# Patient Record
Sex: FEMALE | Race: WHITE | HISPANIC OR LATINO | URBAN - METROPOLITAN AREA
[De-identification: names, ages, dates, MRNs, and addresses within clinical notes are randomized per-mention and may not be internally consistent; named-entity substitution may affect disease eponyms.]

---

## 2018-04-26 ENCOUNTER — ON CAMPUS - OUTPATIENT (OUTPATIENT)
Dept: URBAN - METROPOLITAN AREA HOSPITAL 108 | Facility: HOSPITAL | Age: 38
End: 2018-04-26
Payer: COMMERCIAL

## 2018-04-26 DIAGNOSIS — K75.9 INFLAMMATORY LIVER DISEASE, UNSPECIFIED: ICD-10-CM

## 2018-04-26 PROCEDURE — 99243 OFF/OP CNSLTJ NEW/EST LOW 30: CPT

## 2018-04-27 PROCEDURE — 99212 OFFICE O/P EST SF 10 MIN: CPT

## 2018-05-01 ENCOUNTER — INPATIENT HOSPITAL (OUTPATIENT)
Dept: URBAN - METROPOLITAN AREA HOSPITAL 107 | Facility: HOSPITAL | Age: 38
End: 2018-05-01
Payer: COMMERCIAL

## 2018-05-01 DIAGNOSIS — R94.5 ABNORMAL RESULTS OF LIVER FUNCTION STUDIES: ICD-10-CM

## 2018-05-01 DIAGNOSIS — B17.9 ACUTE VIRAL HEPATITIS, UNSPECIFIED: ICD-10-CM

## 2018-05-01 DIAGNOSIS — R17 UNSPECIFIED JAUNDICE: ICD-10-CM

## 2018-05-01 DIAGNOSIS — L29.9 PRURITUS, UNSPECIFIED: ICD-10-CM

## 2018-05-01 PROCEDURE — 99233 SBSQ HOSP IP/OBS HIGH 50: CPT | Performed by: INTERNAL MEDICINE

## 2018-05-02 ENCOUNTER — INPATIENT HOSPITAL (OUTPATIENT)
Dept: URBAN - METROPOLITAN AREA HOSPITAL 107 | Facility: HOSPITAL | Age: 38
End: 2018-05-02
Payer: COMMERCIAL

## 2018-05-02 DIAGNOSIS — R94.5 ABNORMAL RESULTS OF LIVER FUNCTION STUDIES: ICD-10-CM

## 2018-05-02 DIAGNOSIS — K75.9 INFLAMMATORY LIVER DISEASE, UNSPECIFIED: ICD-10-CM

## 2018-05-02 PROCEDURE — 99232 SBSQ HOSP IP/OBS MODERATE 35: CPT | Performed by: PHYSICIAN ASSISTANT

## 2019-10-31 ENCOUNTER — INITIAL PRENATAL (OUTPATIENT)
Dept: OBSTETRICS AND GYNECOLOGY | Facility: CLINIC | Age: 39
End: 2019-10-31

## 2019-10-31 ENCOUNTER — PROCEDURE VISIT (OUTPATIENT)
Dept: OBSTETRICS AND GYNECOLOGY | Facility: CLINIC | Age: 39
End: 2019-10-31

## 2019-10-31 VITALS — SYSTOLIC BLOOD PRESSURE: 136 MMHG | DIASTOLIC BLOOD PRESSURE: 80 MMHG | WEIGHT: 170 LBS

## 2019-10-31 DIAGNOSIS — O09.521 MULTIGRAVIDA OF ADVANCED MATERNAL AGE IN FIRST TRIMESTER: ICD-10-CM

## 2019-10-31 DIAGNOSIS — O30.041 DICHORIONIC DIAMNIOTIC TWIN PREGNANCY IN FIRST TRIMESTER: ICD-10-CM

## 2019-10-31 DIAGNOSIS — E03.9 HYPOTHYROIDISM AFFECTING PREGNANCY IN FIRST TRIMESTER: ICD-10-CM

## 2019-10-31 DIAGNOSIS — I10 CHRONIC HYPERTENSION: ICD-10-CM

## 2019-10-31 DIAGNOSIS — O99.281 HYPOTHYROIDISM AFFECTING PREGNANCY IN FIRST TRIMESTER: ICD-10-CM

## 2019-10-31 DIAGNOSIS — Z34.01 ENCOUNTER FOR SUPERVISION OF NORMAL FIRST PREGNANCY IN FIRST TRIMESTER: Primary | ICD-10-CM

## 2019-10-31 DIAGNOSIS — O30.041 DICHORIONIC DIAMNIOTIC TWIN PREGNANCY IN FIRST TRIMESTER: Primary | ICD-10-CM

## 2019-10-31 PROCEDURE — 76817 TRANSVAGINAL US OBSTETRIC: CPT | Performed by: OBSTETRICS & GYNECOLOGY

## 2019-10-31 PROCEDURE — 0501F PRENATAL FLOW SHEET: CPT | Performed by: OBSTETRICS & GYNECOLOGY

## 2019-10-31 RX ORDER — FOLIC ACID 1 MG/1
1000 TABLET ORAL DAILY
Refills: 5 | COMMUNITY
Start: 2019-10-07

## 2019-10-31 RX ORDER — LEVOTHYROXINE SODIUM 0.1 MG/1
100 TABLET ORAL DAILY
Refills: 0 | COMMUNITY
Start: 2019-10-18 | End: 2020-05-09 | Stop reason: HOSPADM

## 2019-10-31 RX ORDER — NIFEDIPINE 60 MG/1
60 TABLET, FILM COATED, EXTENDED RELEASE ORAL DAILY
Refills: 0 | COMMUNITY
Start: 2019-10-18

## 2019-10-31 NOTE — PROGRESS NOTES
CC initial prenatal visit for this 39-year-old  Ab1  female using IVF implantation EDC 2020.  Patient has  di-twin gestation with no current problems and fetal heart tones x2 today.  She did have donor oocytes but does want fetal DNA testing in 2 weeks although amniocentesis is not desired.  She has hypothyroidism and is on levothyroxine 100 mcg a day and chronic hypertension treated with Procardia 60 mg a day.  She is interested in a flu shot and Tdap.  She will refill received a flu shot in 2 weeks when she has her Materna 21 test.  She has had a previous left S&O.  Review of Systems - ENT ROS: negative  Hematological and Lymphatic ROS: negative  Endocrine ROS: negative  Breast ROS: negative  Respiratory ROS: negative  Cardiovascular ROS: negative  Gastrointestinal ROS: negative  Genito-Urinary ROS: negative  Musculoskeletal ROS: negative  Neurological ROS: negative  Dermatological ROS: negative  Assessment.  10 weeks gestation with di-di-twins, advanced maternal age with donor oocytes, history hypothyroidism and tonic hypertension, one previous miscarriage during IVF.  Plan.  Return office 2 weeks for Materna 21 and flu shot.  Thyroid function tests today.  Prenatal 3, urine culture, hemoglobin A1c, CMP,.  Patient refusing CF testing at this time.

## 2019-11-01 LAB
ABO GROUP BLD: (no result)
ALBUMIN SERPL-MCNC: 4.1 G/DL (ref 3.5–5.5)
ALBUMIN/GLOB SERPL: 1.4 {RATIO} (ref 1.2–2.2)
ALP SERPL-CCNC: 63 IU/L (ref 39–117)
ALT SERPL-CCNC: 15 IU/L (ref 0–32)
AST SERPL-CCNC: 20 IU/L (ref 0–40)
BACTERIA UR CULT: NO GROWTH
BACTERIA UR CULT: NORMAL
BASOPHILS # BLD AUTO: 0 X10E3/UL (ref 0–0.2)
BASOPHILS NFR BLD AUTO: 0 %
BILIRUB SERPL-MCNC: 0.4 MG/DL (ref 0–1.2)
BLD GP AB SCN SERPL QL: NEGATIVE
BUN SERPL-MCNC: 10 MG/DL (ref 6–24)
BUN/CREAT SERPL: 13 (ref 9–23)
CALCIUM SERPL-MCNC: 10.4 MG/DL (ref 8.7–10.2)
CHLORIDE SERPL-SCNC: 103 MMOL/L (ref 96–106)
CO2 SERPL-SCNC: 18 MMOL/L (ref 20–29)
CREAT SERPL-MCNC: 0.75 MG/DL (ref 0.57–1)
EOSINOPHIL # BLD AUTO: 0.1 X10E3/UL (ref 0–0.4)
EOSINOPHIL NFR BLD AUTO: 2 %
ERYTHROCYTE [DISTWIDTH] IN BLOOD BY AUTOMATED COUNT: 14.5 % (ref 12.3–15.4)
EST. AVERAGE GLUCOSE BLD GHB EST-MCNC: 100 MG/DL
FT4I SERPL CALC-MCNC: 1.8 (ref 1.2–4.9)
GLOBULIN SER CALC-MCNC: 3 G/DL (ref 1.5–4.5)
GLUCOSE SERPL-MCNC: 106 MG/DL (ref 65–99)
HBA1C MFR BLD: 5.1 % (ref 4.8–5.6)
HBV SURFACE AG SERPL QL IA: NEGATIVE
HCT VFR BLD AUTO: 38.2 % (ref 34–46.6)
HCV AB S/CO SERPL IA: <0.1 S/CO RATIO (ref 0–0.9)
HGB BLD-MCNC: 13.4 G/DL (ref 11.1–15.9)
HIV 1+2 AB+HIV1 P24 AG SERPL QL IA: NON REACTIVE
IMM GRANULOCYTES # BLD AUTO: 0 X10E3/UL (ref 0–0.1)
IMM GRANULOCYTES NFR BLD AUTO: 0 %
LYMPHOCYTES # BLD AUTO: 1.2 X10E3/UL (ref 0.7–3.1)
LYMPHOCYTES NFR BLD AUTO: 14 %
MCH RBC QN AUTO: 31 PG (ref 26.6–33)
MCHC RBC AUTO-ENTMCNC: 35.1 G/DL (ref 31.5–35.7)
MCV RBC AUTO: 88 FL (ref 79–97)
MONOCYTES # BLD AUTO: 0.7 X10E3/UL (ref 0.1–0.9)
MONOCYTES NFR BLD AUTO: 8 %
NEUTROPHILS # BLD AUTO: 6.2 X10E3/UL (ref 1.4–7)
NEUTROPHILS NFR BLD AUTO: 76 %
PLATELET # BLD AUTO: 284 X10E3/UL (ref 150–450)
POTASSIUM SERPL-SCNC: 4.2 MMOL/L (ref 3.5–5.2)
PROT SERPL-MCNC: 7.1 G/DL (ref 6–8.5)
RBC # BLD AUTO: 4.32 X10E6/UL (ref 3.77–5.28)
RH BLD: NEGATIVE
RPR SER QL: NON REACTIVE
RUBV IGG SERPL IA-ACNC: 20.3 INDEX
SODIUM SERPL-SCNC: 137 MMOL/L (ref 134–144)
T3RU NFR SERPL: 14 % (ref 24–39)
T4 SERPL-MCNC: 12.8 UG/DL (ref 4.5–12)
TSH SERPL DL<=0.005 MIU/L-ACNC: 1.54 UIU/ML (ref 0.45–4.5)
WBC # BLD AUTO: 8.2 X10E3/UL (ref 3.4–10.8)

## 2019-11-04 LAB
C TRACH RRNA CVX QL NAA+PROBE: NEGATIVE
CONV .: NORMAL
CYTOLOGIST CVX/VAG CYTO: NORMAL
CYTOLOGY CVX/VAG DOC CYTO: NORMAL
CYTOLOGY CVX/VAG DOC THIN PREP: NORMAL
DX ICD CODE: NORMAL
HIV 1 & 2 AB SER-IMP: NORMAL
N GONORRHOEA RRNA CVX QL NAA+PROBE: NEGATIVE
OTHER STN SPEC: NORMAL
STAT OF ADQ CVX/VAG CYTO-IMP: NORMAL

## 2019-11-18 ENCOUNTER — PROCEDURE VISIT (OUTPATIENT)
Dept: OBSTETRICS AND GYNECOLOGY | Facility: CLINIC | Age: 39
End: 2019-11-18

## 2019-11-18 ENCOUNTER — ROUTINE PRENATAL (OUTPATIENT)
Dept: OBSTETRICS AND GYNECOLOGY | Facility: CLINIC | Age: 39
End: 2019-11-18

## 2019-11-18 VITALS — DIASTOLIC BLOOD PRESSURE: 80 MMHG | SYSTOLIC BLOOD PRESSURE: 128 MMHG | WEIGHT: 175 LBS

## 2019-11-18 DIAGNOSIS — Z34.02 ENCOUNTER FOR SUPERVISION OF NORMAL FIRST PREGNANCY IN SECOND TRIMESTER: Primary | ICD-10-CM

## 2019-11-18 DIAGNOSIS — O30.049 TWIN PREGNANCY, DICHORIONIC/DIAMNIOTIC, UNSPECIFIED TRIMESTER: ICD-10-CM

## 2019-11-18 DIAGNOSIS — O09.522 MULTIGRAVIDA OF ADVANCED MATERNAL AGE IN SECOND TRIMESTER: ICD-10-CM

## 2019-11-18 DIAGNOSIS — O30.049 TWIN PREGNANCY, DICHORIONIC/DIAMNIOTIC, UNSPECIFIED TRIMESTER: Primary | ICD-10-CM

## 2019-11-18 PROCEDURE — 90674 CCIIV4 VAC NO PRSV 0.5 ML IM: CPT | Performed by: OBSTETRICS & GYNECOLOGY

## 2019-11-18 PROCEDURE — 0502F SUBSEQUENT PRENATAL CARE: CPT | Performed by: OBSTETRICS & GYNECOLOGY

## 2019-11-18 PROCEDURE — 90471 IMMUNIZATION ADMIN: CPT | Performed by: OBSTETRICS & GYNECOLOGY

## 2019-11-18 PROCEDURE — 76802 OB US < 14 WKS ADDL FETUS: CPT | Performed by: OBSTETRICS & GYNECOLOGY

## 2019-11-18 PROCEDURE — 76801 OB US < 14 WKS SINGLE FETUS: CPT | Performed by: OBSTETRICS & GYNECOLOGY

## 2019-11-18 NOTE — PROGRESS NOTES
CC follow-up prenatal visit.  Getting Materna 21 fetal DNA testing today.  Patient with di-di-twin gestation.  Flu shot today.  Note for work given as should not work greater than 40 hours a week.  Return to office 4 weeks.

## 2019-11-25 LAB
CFDNA.FET/CFDNA.TOTAL SFR FETUS: NORMAL %
CITATION REF LAB TEST: NORMAL
FET CHR 13 TS PLAS.CFDNA QL: NEGATIVE
FET CHR 13+18+21 TS PLAS.CFDNA QL: NORMAL
FET CHR 18 TS PLAS.CFDNA QL: NEGATIVE
FET CHR 21 TS PLAS.CFDNA QL: NEGATIVE
FET Y CHROM PLAS.CFDNA QL: DETECTED
FET Y CHROM PLAS.CFDNA: NORMAL
FETAL FRACTION: NORMAL
GESTATION: NORMAL
GESTATIONAL AGE > 9:: NORMAL
LAB DIRECTOR NAME PROVIDER: NORMAL
LABORATORY COMMENT REPORT: NORMAL
LIMITATIONS OF THE TEST: NORMAL
NOTE: NORMAL
POSITIVE PREDICTIVE VALUE: NORMAL
REF LAB TEST METHOD: NORMAL
SERVICE CMNT 02-IMP: NORMAL
SERVICE CMNT 03-IMP: NORMAL
SERVICE CMNT-IMP: NORMAL
TEST PERFORMANCE INFO SPEC: NORMAL
TEST PERFORMANCE INFO SPEC: NORMAL

## 2019-11-27 ENCOUNTER — TELEPHONE (OUTPATIENT)
Dept: OBSTETRICS AND GYNECOLOGY | Facility: CLINIC | Age: 39
End: 2019-11-27

## 2019-11-27 NOTE — TELEPHONE ENCOUNTER
Patients relative is on the HIPPA form she is calling to get the results of patients genetic testing and gender of baby for their gender reveal party patient would like call back at [phone]

## 2019-11-27 NOTE — TELEPHONE ENCOUNTER
Explained to patient's relative who is on the HIPPA form that although this test was normal for Down's and did show evidence of a male baby, I am not confident that this is accurate enough in checking for either Down's or sex due to the twin gestation.  Reportedly Panorama may be better with twins or even amniocentesis if she really needs to know about Down's.  If we are just interested in checking sex we can do that at 18 weeks with an ultrasound.  I have instructed the relative to have the patient call me next week so we can further discuss whether we need to schedule other testing.  REINIER

## 2019-12-18 ENCOUNTER — ROUTINE PRENATAL (OUTPATIENT)
Dept: OBSTETRICS AND GYNECOLOGY | Facility: CLINIC | Age: 39
End: 2019-12-18

## 2019-12-18 ENCOUNTER — PROCEDURE VISIT (OUTPATIENT)
Dept: OBSTETRICS AND GYNECOLOGY | Facility: CLINIC | Age: 39
End: 2019-12-18

## 2019-12-18 VITALS — DIASTOLIC BLOOD PRESSURE: 88 MMHG | WEIGHT: 175 LBS | SYSTOLIC BLOOD PRESSURE: 130 MMHG

## 2019-12-18 DIAGNOSIS — O09.522 MULTIGRAVIDA OF ADVANCED MATERNAL AGE IN SECOND TRIMESTER: ICD-10-CM

## 2019-12-18 DIAGNOSIS — Z34.02 ENCOUNTER FOR SUPERVISION OF NORMAL FIRST PREGNANCY IN SECOND TRIMESTER: Primary | ICD-10-CM

## 2019-12-18 DIAGNOSIS — O09.521 MULTIGRAVIDA OF ADVANCED MATERNAL AGE IN FIRST TRIMESTER: ICD-10-CM

## 2019-12-18 DIAGNOSIS — O30.049 TWIN PREGNANCY, DICHORIONIC/DIAMNIOTIC, UNSPECIFIED TRIMESTER: ICD-10-CM

## 2019-12-18 DIAGNOSIS — O30.042 DICHORIONIC DIAMNIOTIC TWIN PREGNANCY IN SECOND TRIMESTER: Primary | ICD-10-CM

## 2019-12-18 PROCEDURE — 76817 TRANSVAGINAL US OBSTETRIC: CPT | Performed by: OBSTETRICS & GYNECOLOGY

## 2019-12-18 PROCEDURE — 0502F SUBSEQUENT PRENATAL CARE: CPT | Performed by: OBSTETRICS & GYNECOLOGY

## 2019-12-18 NOTE — PROGRESS NOTES
CC follow-up prenatal visit.  Ultrasound today fetal heart tones 153 and 149 transverse/breech cervical length 3.8 cm .  Offered patient additional fetal DNA testing with panorama or amniocentesis due to advanced maternal age but patient refuses these as VIP not desired regardless.  Nevertheless will make appointment with M consultation.  Checking AFP and TFTs today.  Return to office 4 weeks.

## 2019-12-19 ENCOUNTER — TELEPHONE (OUTPATIENT)
Dept: OBSTETRICS AND GYNECOLOGY | Facility: CLINIC | Age: 39
End: 2019-12-19

## 2019-12-19 LAB
FT4I SERPL CALC-MCNC: 1.5 (ref 1.2–4.9)
T3RU NFR SERPL: 13 % (ref 24–39)
T4 SERPL-MCNC: 11.9 UG/DL (ref 4.5–12)
TSH SERPL DL<=0.005 MIU/L-ACNC: 0.7 UIU/ML (ref 0.45–4.5)

## 2019-12-19 NOTE — TELEPHONE ENCOUNTER
----- Message from Chad Baum MD sent at 12/19/2019 12:25 PM EST -----  Please tell patient that all her thyroid tests were normal for pregnancy.

## 2019-12-20 LAB
AFP ADJ MOM SERPL: 2.49
AFP INTERP SERPL-IMP: NORMAL
AFP INTERP SERPL-IMP: NORMAL
AFP SERPL-MCNC: 84.3 NG/ML
AGE AT DELIVERY: 42.2 YR
GA METHOD: NORMAL
GA: 16.9 WEEKS
IDDM PATIENT QL: NO
LABORATORY COMMENT REPORT: NORMAL
MULTIPLE PREGNANCY: NORMAL
NEURAL TUBE DEFECT RISK FETUS: 726 %
RESULT: NORMAL

## 2019-12-30 ENCOUNTER — TELEPHONE (OUTPATIENT)
Dept: OBSTETRICS AND GYNECOLOGY | Facility: CLINIC | Age: 39
End: 2019-12-30

## 2019-12-30 NOTE — TELEPHONE ENCOUNTER
----- Message from Chad Baum MD sent at 12/30/2019 12:38 PM EST -----  Regarding: FW: Non-Urgent Medical Question  Contact: [phone]  Julieta this patient is supposed to be  getting an appointment with MFM at [facility].  Could you check into this.  She sent a note today to me asking about when her appointment is being scheduled.  Thanks.  REINIER  ----- Message -----  From: Tyron Reynolds CMA  Sent: 12/30/2019  11:36 AM EST  To: Chad Baum MD  Subject: FW: Non-Urgent Medical Question                   See email from patient   ----- Message -----  From: Stephanie Rodriguez  Sent: 12/30/2019  11:25 AM EST  To: [facility]  Subject: Non-Urgent Medical Question                      Good morning, I have some cold, my whole life I've had a terrible cough, last night I cough a lot, I haven't had fever or any other symptoms, I think is because my allergies and the weather conditions, is there any medication I can use? Can I use loratadine for allergies? Please let me know, when I'm coughing for several days I could have other complications.   I need to know too when is my next appointment for ultrasound, is an special one in [facility], is supposed they call me to tell me what day is going to be programmed. Thanks for everything, and you all have a Happy new year!

## 2019-12-30 NOTE — TELEPHONE ENCOUNTER
Sorry, I never received a referral/order for M for this pt.  Please enter in Epic and I will be happy to schedule her.     Thanks,   Monica

## 2019-12-30 NOTE — TELEPHONE ENCOUNTER
I hope I put the right referral for MFM consultation with ultrasound in the orders if not let me know.

## 2020-01-03 ENCOUNTER — TELEPHONE (OUTPATIENT)
Dept: OBSTETRICS AND GYNECOLOGY | Facility: CLINIC | Age: 40
End: 2020-01-03

## 2020-01-03 ENCOUNTER — HOSPITAL ENCOUNTER (EMERGENCY)
Facility: HOSPITAL | Age: 40
Discharge: HOME | End: 2020-01-03
Attending: EMERGENCY MEDICINE | Admitting: EMERGENCY MEDICINE

## 2020-01-03 VITALS
HEART RATE: 79 BPM | WEIGHT: 178.2 LBS | TEMPERATURE: 98 F | SYSTOLIC BLOOD PRESSURE: 144 MMHG | OXYGEN SATURATION: 98 % | RESPIRATION RATE: 18 BRPM | HEIGHT: 63 IN | DIASTOLIC BLOOD PRESSURE: 86 MMHG | BODY MASS INDEX: 31.57 KG/M2

## 2020-01-03 DIAGNOSIS — J06.9 UPPER RESPIRATORY TRACT INFECTION, UNSPECIFIED TYPE: ICD-10-CM

## 2020-01-03 DIAGNOSIS — R05.9 COUGH: Primary | ICD-10-CM

## 2020-01-03 DIAGNOSIS — Z3A.19 PREGNANCY WITH 19 COMPLETED WEEKS GESTATION: ICD-10-CM

## 2020-01-03 LAB
FLUAV AG NPH QL: NEGATIVE
FLUBV AG NPH QL IA: NEGATIVE

## 2020-01-03 PROCEDURE — 94799 UNLISTED PULMONARY SVC/PX: CPT

## 2020-01-03 PROCEDURE — 94640 AIRWAY INHALATION TREATMENT: CPT

## 2020-01-03 PROCEDURE — 87804 INFLUENZA ASSAY W/OPTIC: CPT | Performed by: EMERGENCY MEDICINE

## 2020-01-03 PROCEDURE — 99284 EMERGENCY DEPT VISIT MOD MDM: CPT

## 2020-01-03 RX ORDER — AMOXICILLIN 500 MG/1
500 CAPSULE ORAL 3 TIMES DAILY
Qty: 21 CAPSULE | Refills: 0 | Status: SHIPPED | OUTPATIENT
Start: 2020-01-03 | End: 2020-03-26

## 2020-01-03 RX ORDER — ALBUTEROL SULFATE 90 UG/1
2 AEROSOL, METERED RESPIRATORY (INHALATION) EVERY 6 HOURS PRN
Qty: 1 INHALER | Refills: 0 | Status: SHIPPED | OUTPATIENT
Start: 2020-01-03 | End: 2020-02-26 | Stop reason: SDUPTHER

## 2020-01-03 RX ORDER — ALBUTEROL SULFATE 2.5 MG/3ML
2.5 SOLUTION RESPIRATORY (INHALATION) ONCE
Status: COMPLETED | OUTPATIENT
Start: 2020-01-03 | End: 2020-01-03

## 2020-01-03 RX ORDER — AMOXICILLIN 500 MG/1
1000 CAPSULE ORAL 3 TIMES DAILY
Qty: 21 CAPSULE | Refills: 0 | Status: SHIPPED | OUTPATIENT
Start: 2020-01-03 | End: 2020-01-03 | Stop reason: SDUPTHER

## 2020-01-03 RX ADMIN — ALBUTEROL SULFATE 2.5 MG: 2.5 SOLUTION RESPIRATORY (INHALATION) at 19:51

## 2020-01-03 NOTE — TELEPHONE ENCOUNTER
Yes, if her PCP is concerned that she has something that cannot be treated through their office that is not pregnancy related, she should go to ED.  We do not treat wheezing in our office.

## 2020-01-03 NOTE — TELEPHONE ENCOUNTER
Patients pcp called and said that patient is having wheezing chest congestion and trouble with a cough, should she go to the ER because I know she would not be able to be seen here today and already went to her pcp

## 2020-01-04 NOTE — DISCHARGE INSTRUCTIONS
You can use over-the-counter Mucinex, Tylenol and Benadryl as needed for cough and pain.  Antibiotic as needed and follow-up with your obstetrician.

## 2020-01-04 NOTE — ED PROVIDER NOTES
EMERGENCY DEPARTMENT ENCOUNTER    Room Number:    Date of encounter:  1/3/2020  PCP: Rene Campuzano  Historian: patient  OB/GYN: Dr. Baum    HPI:  Chief Complaint: cough  Context: Stephanie Rodriguez is a 39 y.o. female who is 19 weeks pregnant with twins ([id number]), who presents to the ED c/o productive cough with yellow sputum for the past 4 days. Her cough is worsened with exertion. The cough is moderate. Denies fever, chills, nausea, vomiting, diarrhea, and urinary. There are no other complaints.     PAST MEDICAL HISTORY  Active Ambulatory Problems     Diagnosis Date Noted   • Twin pregnancy, twins dichorionic and diamniotic 2019     Resolved Ambulatory Problems     Diagnosis Date Noted   • No Resolved Ambulatory Problems     Past Medical History:   Diagnosis Date   • Disease of thyroid gland    • Hepatitis    • Hypertension          PAST SURGICAL HISTORY  Past Surgical History:   Procedure Laterality Date   • BREAST AUGMENTATION     • OVARIAN CYST SURGERY           FAMILY HISTORY  Family History   Problem Relation Age of Onset   • Ovarian cancer Maternal Aunt          SOCIAL HISTORY  Social History     Socioeconomic History   • Marital status: Single     Spouse name: Not on file   • Number of children: Not on file   • Years of education: Not on file   • Highest education level: Not on file   Tobacco Use   • Smoking status: Never Smoker   Substance and Sexual Activity   • Alcohol use: No     Frequency: Never         ALLERGIES  Patient has no known allergies.        REVIEW OF SYSTEMS  Review of Systems   Constitutional: Negative for fever.   HENT: Negative for sore throat.    Eyes: Negative.    Respiratory: Positive for cough (productive, yellow). Negative for shortness of breath.    Cardiovascular: Negative for chest pain.   Gastrointestinal: Negative for abdominal pain, diarrhea and vomiting.   Genitourinary: Negative for dysuria.   Musculoskeletal: Negative for neck pain.   Skin: Negative  for rash.   Allergic/Immunologic: Negative.    Neurological: Negative for weakness, numbness and headaches.   Hematological: Negative.    Psychiatric/Behavioral: Negative.    All other systems reviewed and are negative.     PHYSICAL EXAM    I have reviewed the triage vital signs and nursing notes.    ED Triage Vitals   Temp Heart Rate Resp BP SpO2   01/03/20 1639 01/03/20 1639 01/03/20 1639 01/03/20 1703 01/03/20 1639   98 °F (36.7 °C) 97 18 138/83 100 %      Temp src Heart Rate Source Patient Position BP Location FiO2 (%)   01/03/20 1639 01/03/20 1639 -- -- --   Tympanic Monitor            Physical Exam   Constitutional: She is oriented to person, place, and time. No distress.   HENT:   Head: Normocephalic and atraumatic.   Right Ear: Tympanic membrane normal.   Left Ear: Tympanic membrane normal.   Mouth/Throat: Oropharynx is clear and moist.   Eyes: Pupils are equal, round, and reactive to light. EOM are normal.   Neck: Normal range of motion. Neck supple.   Cardiovascular: Normal rate, regular rhythm and normal heart sounds.   Pulmonary/Chest: Effort normal. No respiratory distress. She has wheezes (occasional, expiratory, at the base).   Abdominal: There is no tenderness. There is no rebound and no guarding.   Pt is gravid with fundal height being about a centimeter above her umbilicius.    Musculoskeletal: Normal range of motion. She exhibits edema (trace, pedal, bilateral).   No calf tenderness.    Lymphadenopathy:     She has no cervical adenopathy.   Neurological: She is alert and oriented to person, place, and time. She has normal sensation and normal strength.   Skin: Skin is warm and dry. No rash noted.   Psychiatric: Mood and affect normal.   Nursing note and vitals reviewed.      LAB RESULTS  Recent Results (from the past 24 hour(s))   Influenza Antigen, Rapid - Swab, Nasopharynx    Collection Time: 01/03/20  5:08 PM   Result Value Ref Range    Influenza A Ag, EIA Negative Negative    Influenza B Ag,  EIA Negative Negative       Ordered the above labs and independently reviewed the results.        PROCEDURES    Procedures      MEDICATIONS GIVEN IN ER    Medications   albuterol (PROVENTIL) nebulizer solution 0.083% 2.5 mg/3mL (2.5 mg Nebulization Given 1/3/20 1951)         PROGRESS, DATA ANALYSIS, CONSULTS, AND MEDICAL DECISION MAKING    All labs have been independently reviewed by me.  All radiology studies have been reviewed by me and discussed with radiologist dictating the report.   EKG's independently viewed and interpreted by me.  Discussion below represents my analysis of pertinent findings related to patient's condition, differential diagnosis, treatment plan and final disposition.       1927- I offered pt a CXR to check for possible pneumonia and informed her of the risks of this. Pt denies CXR at this time.     2003- Rechecked pt. Pt is resting comfortably. Nurse was able to obtain fetal heart tones. She reports her SOA and cough are improved after breathing treatment. Informed pt of her negative flu results. Discussed plan to discharge the pt home with Rx for Amoxil and Albuterol. Advised pt to f/u with her PCP and her OB/GYN. Additionally, I advised pt to use mucinex, tylenol, and benadryl as needed. RTED instructions given. Pt understands and agrees with the plan, all questions answered.;  --  AS OF 8:09 PM VITALS:    BP - 144/86  HR - 79  TEMP - 98 °F (36.7 °C) (Tympanic)  O2 SATS - 98%  --    DIAGNOSIS  Final diagnoses:   Cough   Upper respiratory tract infection, unspecified type   Pregnancy with 19 completed weeks gestation         DISPOSITION  DISCHARGE    Patient discharged in stable condition.    Reviewed implications of results, diagnosis, meds, responsibility to follow up, warning signs and symptoms of possible worsening, potential complications and reasons to return to ER.    Patient/Family voiced understanding of above instructions.    Discussed plan for discharge, as there is no emergent  indication for admission. Patient referred to primary care provider for BP management due to today's BP. Pt/family is agreeable and understands need for follow up and repeat testing.  Pt is aware that discharge does not mean that nothing is wrong but it indicates no emergency is present that requires admission and they must continue care with follow-up as given below or physician of their choice.     FOLLOW-UP  Chad Baum MD  [address] [address]  [phone]    Call            Medication List      New Prescriptions    albuterol sulfate  (90 Base) MCG/ACT inhaler  Commonly known as:  PROVENTIL HFA;VENTOLIN HFA;PROAIR HFA  Inhale 2 puffs Every 6 (Six) Hours As Needed for Shortness of Air.     amoxicillin 500 MG capsule  Commonly known as:  AMOXIL  Take 2 capsules by mouth 3 (Three) Times a Day.            --  Documentation assistance provided by florina Alexander for Dr. Donnell MD.  Information recorded by the scribe was done at my direction and has been verified and validated by me.     Tino Alexander  01/03/20 2009       Jamal Jacobo MD  01/03/20 8901

## 2020-01-06 ENCOUNTER — TRANSCRIBE ORDERS (OUTPATIENT)
Dept: OBSTETRICS AND GYNECOLOGY | Facility: CLINIC | Age: 40
End: 2020-01-06

## 2020-01-06 ENCOUNTER — TELEPHONE (OUTPATIENT)
Dept: OBSTETRICS AND GYNECOLOGY | Facility: CLINIC | Age: 40
End: 2020-01-06

## 2020-01-06 ENCOUNTER — HOSPITAL ENCOUNTER (OUTPATIENT)
Dept: ULTRASOUND IMAGING | Facility: HOSPITAL | Age: 40
Discharge: HOME | End: 2020-01-06
Admitting: OBSTETRICS & GYNECOLOGY

## 2020-01-06 DIAGNOSIS — O30.049 TWIN PREGNANCY, DICHORIONIC/DIAMNIOTIC, UNSPECIFIED TRIMESTER: ICD-10-CM

## 2020-01-06 DIAGNOSIS — O09.522 MULTIGRAVIDA OF ADVANCED MATERNAL AGE IN SECOND TRIMESTER: ICD-10-CM

## 2020-01-06 DIAGNOSIS — O30.042 DICHORIONIC DIAMNIOTIC TWIN PREGNANCY IN SECOND TRIMESTER: Primary | ICD-10-CM

## 2020-01-06 PROCEDURE — 76811 OB US DETAILED SNGL FETUS: CPT

## 2020-01-06 PROCEDURE — 76812 OB US DETAILED ADDL FETUS: CPT

## 2020-01-06 NOTE — TELEPHONE ENCOUNTER
Juliana with [facility] called regarding this pt, stated Dr. Burrell has some recommendations regarding this pt and would like you to look at those. Also U/S from today. SM

## 2020-01-15 ENCOUNTER — ROUTINE PRENATAL (OUTPATIENT)
Dept: OBSTETRICS AND GYNECOLOGY | Facility: CLINIC | Age: 40
End: 2020-01-15

## 2020-01-15 VITALS — SYSTOLIC BLOOD PRESSURE: 136 MMHG | WEIGHT: 177.6 LBS | DIASTOLIC BLOOD PRESSURE: 87 MMHG | BODY MASS INDEX: 31.46 KG/M2

## 2020-01-15 DIAGNOSIS — O30.049 TWIN PREGNANCY, DICHORIONIC/DIAMNIOTIC, UNSPECIFIED TRIMESTER: ICD-10-CM

## 2020-01-15 DIAGNOSIS — Z34.02 ENCOUNTER FOR SUPERVISION OF NORMAL FIRST PREGNANCY IN SECOND TRIMESTER: Primary | ICD-10-CM

## 2020-01-15 LAB
GLUCOSE UR STRIP-MCNC: NEGATIVE MG/DL
PROT UR STRIP-MCNC: ABNORMAL MG/DL

## 2020-01-15 PROCEDURE — 0502F SUBSEQUENT PRENATAL CARE: CPT | Performed by: OBSTETRICS & GYNECOLOGY

## 2020-01-15 RX ORDER — LEVOTHYROXINE SODIUM 0.12 MG/1
125 TABLET ORAL DAILY
Qty: 30 TABLET | Refills: 4
Start: 2020-01-15 | End: 2020-02-13 | Stop reason: SDUPTHER

## 2020-01-20 DIAGNOSIS — O30.049 TWIN PREGNANCY, DICHORIONIC/DIAMNIOTIC, UNSPECIFIED TRIMESTER: Primary | ICD-10-CM

## 2020-01-21 LAB
ALBUMIN SERPL-MCNC: 3.4 G/DL (ref 3.8–4.8)
ALBUMIN/GLOB SERPL: 1.4 {RATIO} (ref 1.2–2.2)
ALP SERPL-CCNC: 87 IU/L (ref 39–117)
ALT SERPL-CCNC: 10 IU/L (ref 0–32)
AST SERPL-CCNC: 13 IU/L (ref 0–40)
BASOPHILS # BLD AUTO: 0 X10E3/UL (ref 0–0.2)
BASOPHILS NFR BLD AUTO: 0 %
BILIRUB SERPL-MCNC: 0.2 MG/DL (ref 0–1.2)
BUN SERPL-MCNC: 7 MG/DL (ref 6–24)
BUN/CREAT SERPL: 11 (ref 9–23)
CALCIUM SERPL-MCNC: 9.1 MG/DL (ref 8.7–10.2)
CHLORIDE SERPL-SCNC: 105 MMOL/L (ref 96–106)
CO2 SERPL-SCNC: 19 MMOL/L (ref 20–29)
CREAT SERPL-MCNC: 0.64 MG/DL (ref 0.57–1)
EOSINOPHIL # BLD AUTO: 0.2 X10E3/UL (ref 0–0.4)
EOSINOPHIL NFR BLD AUTO: 3 %
ERYTHROCYTE [DISTWIDTH] IN BLOOD BY AUTOMATED COUNT: 13.6 % (ref 11.7–15.4)
GLOBULIN SER CALC-MCNC: 2.5 G/DL (ref 1.5–4.5)
GLUCOSE SERPL-MCNC: 90 MG/DL (ref 65–99)
HCT VFR BLD AUTO: 35.9 % (ref 34–46.6)
HGB BLD-MCNC: 12 G/DL (ref 11.1–15.9)
IMM GRANULOCYTES # BLD AUTO: 0.1 X10E3/UL (ref 0–0.1)
IMM GRANULOCYTES NFR BLD AUTO: 1 %
LYMPHOCYTES # BLD AUTO: 1.6 X10E3/UL (ref 0.7–3.1)
LYMPHOCYTES NFR BLD AUTO: 17 %
MCH RBC QN AUTO: 32 PG (ref 26.6–33)
MCHC RBC AUTO-ENTMCNC: 33.4 G/DL (ref 31.5–35.7)
MCV RBC AUTO: 96 FL (ref 79–97)
MONOCYTES # BLD AUTO: 0.8 X10E3/UL (ref 0.1–0.9)
MONOCYTES NFR BLD AUTO: 8 %
NEUTROPHILS # BLD AUTO: 6.5 X10E3/UL (ref 1.4–7)
NEUTROPHILS NFR BLD AUTO: 71 %
PLATELET # BLD AUTO: 284 X10E3/UL (ref 150–450)
POTASSIUM SERPL-SCNC: 4.1 MMOL/L (ref 3.5–5.2)
PROT 24H UR-MRATE: 336 MG/24 HR (ref 30–150)
PROT SERPL-MCNC: 5.9 G/DL (ref 6–8.5)
PROT UR-MCNC: 14 MG/DL
RBC # BLD AUTO: 3.75 X10E6/UL (ref 3.77–5.28)
SODIUM SERPL-SCNC: 138 MMOL/L (ref 134–144)
WBC # BLD AUTO: 9.1 X10E3/UL (ref 3.4–10.8)

## 2020-01-22 ENCOUNTER — HOSPITAL ENCOUNTER (OUTPATIENT)
Dept: ULTRASOUND IMAGING | Facility: HOSPITAL | Age: 40
Discharge: HOME | End: 2020-01-22
Admitting: OBSTETRICS & GYNECOLOGY

## 2020-01-22 ENCOUNTER — TRANSCRIBE ORDERS (OUTPATIENT)
Dept: ULTRASOUND IMAGING | Facility: HOSPITAL | Age: 40
End: 2020-01-22

## 2020-01-22 DIAGNOSIS — O30.042 DICHORIONIC DIAMNIOTIC TWIN PREGNANCY IN SECOND TRIMESTER: Primary | ICD-10-CM

## 2020-01-22 DIAGNOSIS — O09.521 AMA (ADVANCED MATERNAL AGE) MULTIGRAVIDA 35+, FIRST TRIMESTER: ICD-10-CM

## 2020-01-22 DIAGNOSIS — O30.042 DICHORIONIC DIAMNIOTIC TWIN PREGNANCY IN SECOND TRIMESTER: ICD-10-CM

## 2020-01-22 PROCEDURE — 76815 OB US LIMITED FETUS(S): CPT

## 2020-01-24 LAB
CREAT 24H UR-MRATE: 1380 MG/24 HR (ref 800–1800)
CREAT CL 24H UR+SERPL-VRATE: 150 ML/MIN (ref 88–128)
CREAT SERPL-MCNC: 0.64 MG/DL (ref 0.57–1)
CREAT UR-MCNC: 57.5 MG/DL

## 2020-01-27 LAB — SPECIMEN STATUS: NORMAL

## 2020-01-29 ENCOUNTER — ROUTINE PRENATAL (OUTPATIENT)
Dept: OBSTETRICS AND GYNECOLOGY | Facility: CLINIC | Age: 40
End: 2020-01-29

## 2020-01-29 ENCOUNTER — RESULTS ENCOUNTER (OUTPATIENT)
Dept: OBSTETRICS AND GYNECOLOGY | Facility: CLINIC | Age: 40
End: 2020-01-29

## 2020-01-29 VITALS — WEIGHT: 180 LBS | DIASTOLIC BLOOD PRESSURE: 78 MMHG | BODY MASS INDEX: 31.89 KG/M2 | SYSTOLIC BLOOD PRESSURE: 127 MMHG

## 2020-01-29 DIAGNOSIS — O30.049 TWIN PREGNANCY, DICHORIONIC/DIAMNIOTIC, UNSPECIFIED TRIMESTER: ICD-10-CM

## 2020-01-29 DIAGNOSIS — Z34.80 SUPERVISION OF OTHER NORMAL PREGNANCY: Primary | ICD-10-CM

## 2020-01-29 DIAGNOSIS — Z34.80 SUPERVISION OF OTHER NORMAL PREGNANCY: ICD-10-CM

## 2020-01-29 PROCEDURE — 0502F SUBSEQUENT PRENATAL CARE: CPT | Performed by: OBSTETRICS & GYNECOLOGY

## 2020-01-29 NOTE — PROGRESS NOTES
CC   follow-up prenatal visit.  Good fetal movement and growth.  Quick ultrasound today baby A breech 144 and baby B transverse 140.  Seeing MFM tomorrow.  Has a fetal echocardiogram scheduled.  Due to mildly elevated 24-hour urine protein will repeat 24-hour urine next visit in 2 weeks.  We will also performed 28-week profile at that visit.

## 2020-02-05 ENCOUNTER — HOSPITAL ENCOUNTER (OUTPATIENT)
Dept: ULTRASOUND IMAGING | Facility: HOSPITAL | Age: 40
Discharge: HOME | End: 2020-02-05
Admitting: OBSTETRICS & GYNECOLOGY

## 2020-02-05 ENCOUNTER — TRANSCRIBE ORDERS (OUTPATIENT)
Dept: ULTRASOUND IMAGING | Facility: HOSPITAL | Age: 40
End: 2020-02-05

## 2020-02-05 DIAGNOSIS — O30.042 DICHORIONIC DIAMNIOTIC TWIN PREGNANCY IN SECOND TRIMESTER: Primary | ICD-10-CM

## 2020-02-05 DIAGNOSIS — O30.042 DICHORIONIC DIAMNIOTIC TWIN PREGNANCY IN SECOND TRIMESTER: ICD-10-CM

## 2020-02-05 DIAGNOSIS — O09.521 AMA (ADVANCED MATERNAL AGE) MULTIGRAVIDA 35+, FIRST TRIMESTER: ICD-10-CM

## 2020-02-05 PROCEDURE — 93325 DOPPLER ECHO COLOR FLOW MAPG: CPT

## 2020-02-05 PROCEDURE — 76825 ECHO EXAM OF FETAL HEART: CPT

## 2020-02-05 PROCEDURE — 93325 DOPPLER ECHO COLOR FLOW MAPG: CPT | Performed by: OBSTETRICS & GYNECOLOGY

## 2020-02-05 PROCEDURE — 76825 ECHO EXAM OF FETAL HEART: CPT | Performed by: OBSTETRICS & GYNECOLOGY

## 2020-02-05 PROCEDURE — 76816 OB US FOLLOW-UP PER FETUS: CPT | Performed by: OBSTETRICS & GYNECOLOGY

## 2020-02-05 PROCEDURE — 76816 OB US FOLLOW-UP PER FETUS: CPT

## 2020-02-13 ENCOUNTER — ROUTINE PRENATAL (OUTPATIENT)
Dept: OBSTETRICS AND GYNECOLOGY | Facility: CLINIC | Age: 40
End: 2020-02-13

## 2020-02-13 VITALS — WEIGHT: 183 LBS | BODY MASS INDEX: 32.42 KG/M2 | SYSTOLIC BLOOD PRESSURE: 130 MMHG | DIASTOLIC BLOOD PRESSURE: 85 MMHG

## 2020-02-13 DIAGNOSIS — Z34.02 ENCOUNTER FOR SUPERVISION OF NORMAL FIRST PREGNANCY IN SECOND TRIMESTER: Primary | ICD-10-CM

## 2020-02-13 DIAGNOSIS — Z34.80 SUPERVISION OF OTHER NORMAL PREGNANCY: ICD-10-CM

## 2020-02-13 DIAGNOSIS — O30.049 TWIN PREGNANCY, DICHORIONIC/DIAMNIOTIC, UNSPECIFIED TRIMESTER: ICD-10-CM

## 2020-02-13 LAB
GLUCOSE UR STRIP-MCNC: NEGATIVE MG/DL
PROT UR STRIP-MCNC: NEGATIVE MG/DL

## 2020-02-13 PROCEDURE — 0502F SUBSEQUENT PRENATAL CARE: CPT | Performed by: OBSTETRICS & GYNECOLOGY

## 2020-02-13 NOTE — PROGRESS NOTES
CC follow-up prenatal visit.  Good fetal movement and growth.  Fetal heart tones x2 with ultrasound.  Patient brought in 24-hour urine for protein and creatinine clearance today.  Checking TFTs today along with 1 hour glucose.  Patient to return to office in 2 weeks.  Tentatively has 28-week scan scheduled with TERE.

## 2020-02-14 LAB
BASOPHILS # BLD AUTO: 0.04 10*3/MM3 (ref 0–0.2)
BASOPHILS NFR BLD AUTO: 0.4 % (ref 0–1.5)
BLD GP AB SCN SERPL QL: NEGATIVE
CREAT 24H UR-MRATE: 1.55 G/24 HR (ref 0.7–1.6)
CREAT CL 24H UR+SERPL-VRATE: 215.6 ML/MIN (ref 74.3–113.9)
CREAT SERPL-MCNC: 0.5 MG/DL (ref 0.57–1)
CREAT UR-MCNC: 57.5 MG/DL
EOSINOPHIL # BLD AUTO: 0.24 10*3/MM3 (ref 0–0.4)
EOSINOPHIL NFR BLD AUTO: 2.5 % (ref 0.3–6.2)
ERYTHROCYTE [DISTWIDTH] IN BLOOD BY AUTOMATED COUNT: 12.8 % (ref 12.3–15.4)
GLUCOSE 1H P 50 G GLC PO SERPL-MCNC: 106 MG/DL (ref 65–179)
HCT VFR BLD AUTO: 34 % (ref 34–46.6)
HGB BLD-MCNC: 11.8 G/DL (ref 12–15.9)
IMM GRANULOCYTES # BLD AUTO: 0.14 10*3/MM3 (ref 0–0.05)
IMM GRANULOCYTES NFR BLD AUTO: 1.5 % (ref 0–0.5)
LYMPHOCYTES # BLD AUTO: 1.13 10*3/MM3 (ref 0.7–3.1)
LYMPHOCYTES NFR BLD AUTO: 12 % (ref 19.6–45.3)
MCH RBC QN AUTO: 33.1 PG (ref 26.6–33)
MCHC RBC AUTO-ENTMCNC: 34.7 G/DL (ref 31.5–35.7)
MCV RBC AUTO: 95.5 FL (ref 79–97)
MONOCYTES # BLD AUTO: 0.59 10*3/MM3 (ref 0.1–0.9)
MONOCYTES NFR BLD AUTO: 6.3 % (ref 5–12)
NEUTROPHILS # BLD AUTO: 7.28 10*3/MM3 (ref 1.7–7)
NEUTROPHILS NFR BLD AUTO: 77.3 % (ref 42.7–76)
NRBC BLD AUTO-RTO: 0 /100 WBC (ref 0–0.2)
PLATELET # BLD AUTO: 237 10*3/MM3 (ref 140–450)
PROT 24H UR-MRATE: 216 MG/24HOURS (ref 0–150)
PROT UR-MCNC: 8 MG/DL
RBC # BLD AUTO: 3.56 10*6/MM3 (ref 3.77–5.28)
TSH SERPL DL<=0.005 MIU/L-ACNC: 0.89 UIU/ML (ref 0.27–4.2)
WBC # BLD AUTO: 9.42 10*3/MM3 (ref 3.4–10.8)

## 2020-02-14 RX ORDER — LEVOTHYROXINE SODIUM 0.12 MG/1
125 TABLET ORAL DAILY
Qty: 30 TABLET | Refills: 0
Start: 2020-02-14 | End: 2020-05-27 | Stop reason: SDUPTHER

## 2020-02-26 ENCOUNTER — ROUTINE PRENATAL (OUTPATIENT)
Dept: OBSTETRICS AND GYNECOLOGY | Facility: CLINIC | Age: 40
End: 2020-02-26

## 2020-02-26 VITALS — BODY MASS INDEX: 32.59 KG/M2 | SYSTOLIC BLOOD PRESSURE: 126 MMHG | DIASTOLIC BLOOD PRESSURE: 70 MMHG | WEIGHT: 184 LBS

## 2020-02-26 DIAGNOSIS — Z34.02 ENCOUNTER FOR SUPERVISION OF NORMAL FIRST PREGNANCY IN SECOND TRIMESTER: Primary | ICD-10-CM

## 2020-02-26 DIAGNOSIS — J45.31 MILD PERSISTENT ASTHMA WITH EXACERBATION: ICD-10-CM

## 2020-02-26 PROCEDURE — 0502F SUBSEQUENT PRENATAL CARE: CPT | Performed by: OBSTETRICS & GYNECOLOGY

## 2020-02-26 RX ORDER — ALBUTEROL SULFATE 90 UG/1
2 AEROSOL, METERED RESPIRATORY (INHALATION) EVERY 6 HOURS PRN
Qty: 1 INHALER | Refills: 0 | Status: SHIPPED | OUTPATIENT
Start: 2020-02-26 | End: 2020-03-03 | Stop reason: SDUPTHER

## 2020-02-26 RX ORDER — AZITHROMYCIN 250 MG/1
TABLET, FILM COATED ORAL
Qty: 6 TABLET | Refills: 0 | Status: SHIPPED | OUTPATIENT
Start: 2020-02-26 | End: 2020-05-09 | Stop reason: HOSPADM

## 2020-02-26 NOTE — PROGRESS NOTES
CC follow-up prenatal visit.  24-hour urine protein and thyroid tests and 1 hour glucose all normal.  O- blood type.  Patient still has a cough and lungs demonstrate some wheezing.  Will treat with Z-Crispin and Ventolin inhaler.  Seeing MFM next week and will return in 2 weeks for appointment with RhoGam  To be administered then.

## 2020-03-04 ENCOUNTER — HOSPITAL ENCOUNTER (OUTPATIENT)
Dept: ULTRASOUND IMAGING | Facility: HOSPITAL | Age: 40
Discharge: HOME | End: 2020-03-04
Admitting: OBSTETRICS & GYNECOLOGY

## 2020-03-04 ENCOUNTER — TRANSCRIBE ORDERS (OUTPATIENT)
Dept: OBSTETRICS AND GYNECOLOGY | Facility: CLINIC | Age: 40
End: 2020-03-04

## 2020-03-04 ENCOUNTER — TRANSCRIBE ORDERS (OUTPATIENT)
Dept: ULTRASOUND IMAGING | Facility: HOSPITAL | Age: 40
End: 2020-03-04

## 2020-03-04 ENCOUNTER — OFFICE VISIT (OUTPATIENT)
Dept: OBSTETRICS AND GYNECOLOGY | Facility: CLINIC | Age: 40
End: 2020-03-04

## 2020-03-04 VITALS
BODY MASS INDEX: 31.51 KG/M2 | DIASTOLIC BLOOD PRESSURE: 62 MMHG | WEIGHT: 184.6 LBS | SYSTOLIC BLOOD PRESSURE: 118 MMHG | HEART RATE: 97 BPM | HEIGHT: 64 IN

## 2020-03-04 DIAGNOSIS — O10.919 CHRONIC HYPERTENSION AFFECTING PREGNANCY: Primary | ICD-10-CM

## 2020-03-04 DIAGNOSIS — O09.513 PRIMIGRAVIDA OF ADVANCED MATERNAL AGE IN THIRD TRIMESTER: ICD-10-CM

## 2020-03-04 DIAGNOSIS — O30.043 DICHORIONIC DIAMNIOTIC TWIN PREGNANCY IN THIRD TRIMESTER: ICD-10-CM

## 2020-03-04 DIAGNOSIS — O09.521 AMA (ADVANCED MATERNAL AGE) MULTIGRAVIDA 35+, FIRST TRIMESTER: ICD-10-CM

## 2020-03-04 DIAGNOSIS — O30.042 DICHORIONIC DIAMNIOTIC TWIN PREGNANCY IN SECOND TRIMESTER: ICD-10-CM

## 2020-03-04 PROBLEM — O09.519 AMA (ADVANCED MATERNAL AGE) PRIMIGRAVIDA 35+: Status: ACTIVE | Noted: 2020-03-04

## 2020-03-04 PROCEDURE — 76820 UMBILICAL ARTERY ECHO: CPT | Performed by: OBSTETRICS & GYNECOLOGY

## 2020-03-04 PROCEDURE — 99242 OFF/OP CONSLTJ NEW/EST SF 20: CPT | Performed by: OBSTETRICS & GYNECOLOGY

## 2020-03-04 PROCEDURE — 76820 UMBILICAL ARTERY ECHO: CPT

## 2020-03-04 PROCEDURE — 76816 OB US FOLLOW-UP PER FETUS: CPT

## 2020-03-04 PROCEDURE — 76816 OB US FOLLOW-UP PER FETUS: CPT | Performed by: OBSTETRICS & GYNECOLOGY

## 2020-03-04 RX ORDER — ALBUTEROL SULFATE 90 UG/1
2 AEROSOL, METERED RESPIRATORY (INHALATION) EVERY 6 HOURS PRN
Qty: 1 INHALER | Refills: 0 | Status: SHIPPED | OUTPATIENT
Start: 2020-03-04

## 2020-03-04 RX ORDER — NIFEDIPINE 60 MG/1
60 TABLET, FILM COATED, EXTENDED RELEASE ORAL DAILY
COMMUNITY
Start: 2019-12-13 | End: 2020-05-09 | Stop reason: HOSPADM

## 2020-03-04 RX ORDER — LORATADINE 10 MG/1
10 TABLET ORAL DAILY
COMMUNITY

## 2020-03-04 NOTE — PROGRESS NOTES
Patient seen in Maternal Fetal Medicine clinic today. Please see full note in [facility].   Janine Dominguez MD

## 2020-03-06 PROCEDURE — 99283 EMERGENCY DEPT VISIT LOW MDM: CPT

## 2020-03-07 ENCOUNTER — APPOINTMENT (OUTPATIENT)
Dept: GENERAL RADIOLOGY | Facility: HOSPITAL | Age: 40
End: 2020-03-07

## 2020-03-07 ENCOUNTER — HOSPITAL ENCOUNTER (EMERGENCY)
Facility: HOSPITAL | Age: 40
Discharge: HOME | End: 2020-03-07
Attending: EMERGENCY MEDICINE | Admitting: EMERGENCY MEDICINE

## 2020-03-07 VITALS
DIASTOLIC BLOOD PRESSURE: 69 MMHG | HEIGHT: 64 IN | OXYGEN SATURATION: 95 % | HEART RATE: 82 BPM | BODY MASS INDEX: 31.91 KG/M2 | SYSTOLIC BLOOD PRESSURE: 138 MMHG | TEMPERATURE: 97.7 F | WEIGHT: 186.9 LBS | RESPIRATION RATE: 18 BRPM

## 2020-03-07 DIAGNOSIS — J18.9 COMMUNITY ACQUIRED PNEUMONIA OF LEFT UPPER LOBE OF LUNG: Primary | ICD-10-CM

## 2020-03-07 DIAGNOSIS — Z3A.28 28 WEEKS GESTATION OF PREGNANCY: ICD-10-CM

## 2020-03-07 PROCEDURE — 94799 UNLISTED PULMONARY SVC/PX: CPT

## 2020-03-07 PROCEDURE — 71046 X-RAY EXAM CHEST 2 VIEWS: CPT

## 2020-03-07 PROCEDURE — 94640 AIRWAY INHALATION TREATMENT: CPT

## 2020-03-07 RX ORDER — ALBUTEROL SULFATE 2.5 MG/3ML
2.5 SOLUTION RESPIRATORY (INHALATION) ONCE
Status: COMPLETED | OUTPATIENT
Start: 2020-03-07 | End: 2020-03-07

## 2020-03-07 RX ORDER — AZITHROMYCIN 250 MG/1
TABLET, FILM COATED ORAL
Qty: 6 TABLET | Refills: 0 | Status: SHIPPED | OUTPATIENT
Start: 2020-03-07 | End: 2020-05-09 | Stop reason: HOSPADM

## 2020-03-07 RX ORDER — ALBUTEROL SULFATE 2.5 MG/3ML
2.5 SOLUTION RESPIRATORY (INHALATION)
Status: ACTIVE | OUTPATIENT
Start: 2020-03-07 | End: 2020-03-07

## 2020-03-07 RX ORDER — ALBUTEROL SULFATE 2.5 MG/3ML
2.5 SOLUTION RESPIRATORY (INHALATION) EVERY 4 HOURS PRN
Qty: 25 VIAL | Refills: 0 | Status: SHIPPED | OUTPATIENT
Start: 2020-03-07 | End: 2020-05-09 | Stop reason: HOSPADM

## 2020-03-07 RX ORDER — ALBUTEROL SULFATE 90 UG/1
2 AEROSOL, METERED RESPIRATORY (INHALATION) ONCE
Status: COMPLETED | OUTPATIENT
Start: 2020-03-07 | End: 2020-03-07

## 2020-03-07 RX ADMIN — ALBUTEROL SULFATE 2.5 MG: 2.5 SOLUTION RESPIRATORY (INHALATION) at 01:25

## 2020-03-07 RX ADMIN — ALBUTEROL SULFATE 2 PUFF: 90 AEROSOL, METERED RESPIRATORY (INHALATION) at 03:59

## 2020-03-07 RX ADMIN — ALBUTEROL SULFATE 2.5 MG: 2.5 SOLUTION RESPIRATORY (INHALATION) at 03:55

## 2020-03-07 NOTE — ED PROVIDER NOTES
EMERGENCY DEPARTMENT ENCOUNTER    Room Number:  21/21  Date of encounter:  3/7/2020  PCP: Rene Campuzano  Historian: patient  Dr Baum, OB/GYN      HPI:  Chief Complaint: cough  A complete HPI/ROS/PMH/PSH/SH/FH are unobtainable due to: nothing    Context: Stephanie Bailon is a 40 y.o. female who presents to the ED c/o episodic dry cough that has been ongoing for 8 weeks. She is wheezing but denies chest pain, fever, chills and shortness of air. Patient was seen at onset and prescribed Ventolin and antibiotics which have not improved symptoms. Patient is currently 28 weeks pregnant and is followed by Dr Baum, OB/GYN. No other complaints.       PAST MEDICAL HISTORY  Active Ambulatory Problems     Diagnosis Date Noted   • Twin pregnancy, twins dichorionic and diamniotic 11/18/2019   • AMA (advanced maternal age) primigravida 35+ 03/04/2020   • Chronic hypertension affecting pregnancy 03/04/2020     Resolved Ambulatory Problems     Diagnosis Date Noted   • No Resolved Ambulatory Problems     Past Medical History:   Diagnosis Date   • Disease of thyroid gland    • Hepatitis    • Hypertension          PAST SURGICAL HISTORY  Past Surgical History:   Procedure Laterality Date   • BREAST AUGMENTATION     • OVARIAN CYST SURGERY           FAMILY HISTORY  Family History   Problem Relation Age of Onset   • Ovarian cancer Maternal Aunt    • Birth defects Neg Hx          SOCIAL HISTORY  Social History     Socioeconomic History   • Marital status: Single     Spouse name: Not on file   • Number of children: Not on file   • Years of education: Not on file   • Highest education level: Not on file   Tobacco Use   • Smoking status: Never Smoker   Substance and Sexual Activity   • Alcohol use: No     Frequency: Never   • Drug use: Never   • Sexual activity: Yes     Partners: Male         ALLERGIES  Patient has no known allergies.        REVIEW OF SYSTEMS  Review of Systems   Constitutional: Negative for fever.   HENT: Negative  for sore throat.    Eyes: Negative.    Respiratory: Positive for cough and wheezing. Negative for shortness of breath.    Cardiovascular: Negative for chest pain.   Gastrointestinal: Negative for abdominal pain, diarrhea and vomiting.   Genitourinary: Negative for dysuria.   Musculoskeletal: Negative for neck pain.   Skin: Negative for rash.   Allergic/Immunologic: Negative.    Neurological: Negative for weakness, numbness and headaches.   Hematological: Negative.    Psychiatric/Behavioral: Negative.    All other systems reviewed and are negative.       All other ROS negative except as documented in HPI      PHYSICAL EXAM    I have reviewed the triage vital signs and nursing notes.    ED Triage Vitals [03/07/20 0001]   Temp Heart Rate Resp BP SpO2   97.7 °F (36.5 °C) 90 20 -- 95 %       GENERAL: awake, alert, not distressed  HENT: nares patent, normocephalic, atraumatic  EYES: no scleral icterus  CV: regular rhythm, regular rate, no murmur  RESPIRATORY: normal effort, good air movement but diffuse wheezing bilaterally which is more pronounced on expiration  ABDOMEN: soft, no tenderness, no mass  MUSCULOSKELETAL: no deformity, no edema  NEURO: alert, oriented, moves all extremities, follows commands  SKIN: warm, dry      LAB RESULTS  No results found for this or any previous visit (from the past 24 hour(s)).    Ordered the above labs and independently reviewed the results.      RADIOLOGY  Xr Chest 2 View    Result Date: 3/7/2020  PA AND LATERAL CHEST RADIOGRAPH  HISTORY: Cough and shortness of air  COMPARISON: None available.  FINDINGS: Heart size is within normal limits. There is some interstitial prominence noted within a perihilar distribution. Appearance is nonspecific, but can be associated with both viral pneumonia and reactive airways disease. More focal area of consolidation is noted within the left upper lobe. This may represent a pulmonary nodule, although pneumonia is not completely excluded. Short-term  follow-up exam is recommended. No pneumothorax or pleural effusion is seen.       1. Interstitial prominence in a perihilar distribution. Appearance is nonspecific, but can be associated with reactive airways disease and viral pneumonia. 2. Apparent area of nodularity seen within the left upper lobe. This may represent a pulmonary nodule, such as a granuloma, although a small focus of pneumonia is not excluded. Short-term follow-up exam is recommended.  This report was finalized on 3/7/2020 2:03 AM by Dr. Yahaira Gold M.D.        I ordered the above noted radiological studies. Independently reviewed by me and discussed with radiologist.  See dictation for official radiology interpretation.      PROCEDURES    Procedures      MEDICATIONS GIVEN IN ER    Medications   albuterol (PROVENTIL) nebulizer solution 0.083% 2.5 mg/3mL (2.5 mg Nebulization Not Given 3/7/20 0400)   albuterol (PROVENTIL) nebulizer solution 0.083% 2.5 mg/3mL (2.5 mg Nebulization Given 3/7/20 0125)   albuterol sulfate HFA (PROVENTIL HFA;VENTOLIN HFA;PROAIR HFA) inhaler 2 puff (2 puffs Inhalation Given 3/7/20 0359)         PROGRESS, DATA ANALYSIS, CONSULTS, AND MEDICAL DECISION MAKING    All labs have been independently reviewed by me.  All radiology studies have been reviewed by me and discussed with radiologist dictating report.   EKG's independently reviewed by me.  Discussion below represents my analysis of pertinent findings related to patient's condition, differential diagnosis, treatment plan and final disposition.       --  0105. Ordered Proventil. Ordered CXR.     0106. Called and informed Radiology that patient is 28 weeks pregnant.     0107. Ordered fetal heart tone monitor.    0130. Discussed case with Dr Mary Canales. After a bedside evaluation, he agrees with the plan of care.    0300. Call out to Dr Baum, OB/GYN.     0305. Rechecked patient, vitals stable, she is still wheezing. Informed patient of call out to Dr Baum and CXR  showing PNA. Pt understands and agrees with the plan, all questions answered.    0311. Ordered Proventil.     0321. Spoke with Dr Baum, OB/GYN. He would like the patient to call in four days to schedule an appointment.     0330. Rechecked patient, vitals stable. Family at bedside. Discussed plan to discharge with a course of antibiotics and close follow-up with Dr Bamu. Pt understands and agrees with the plan, all questions answered.    0420. Rechecked patient prior to discharge, vitals stable. Her wheezing has improved, 97% on Room Air.    --  AS OF 6:30 AM VITALS:    BP - 138/69  HR - 82  TEMP - 97.7 °F (36.5 °C) (Tympanic)  02 SATS - 95%      DIAGNOSIS  Final diagnoses:   Community acquired pneumonia of left upper lobe of lung (CMS/HCC)   28 weeks gestation of pregnancy       DISPOSITION  DISCHARGE    Patient discharged in stable condition.    Reviewed implications of results, diagnosis, meds, responsibility to follow up, warning signs and symptoms of possible worsening, potential complications and reasons to return to ER.    Patient/Family voiced understanding of above instructions.    Discussed plan for discharge, as there is no emergent indication for admission. Patient referred to primary care provider for BP management due to today's BP. Pt/family is agreeable and understands need for follow up and repeat testing.  Pt is aware that discharge does not mean that nothing is wrong but it indicates no emergency is present that requires admission and they must continue care with follow-up as given below or physician of their choice.     FOLLOW-UP  Chad Baum MD  [address]  [address]  [facility] [address] [phone]    Call   call Monday to schedule an appointment    Rene Campuzano  [address]  [facility] [id number] [phone]      As needed    [facility] Emergency Department  [address]  [facility] [address] [phone]    If symptoms worsen          Medication List      Changed    * albuterol sulfate  (90 Base) MCG/ACT inhaler  Commonly known as:  PROVENTIL HFA;VENTOLIN HFA;PROAIR HFA  Inhale 2 puffs Every 6 (Six) Hours As Needed for Shortness of Air.  What changed:  Another medication with the same name was added. Make sure   you understand how and when to take each.     * albuterol (2.5 MG/3ML) 0.083% nebulizer solution  Commonly known as:  PROVENTIL  Take 2.5 mg by nebulization Every 4 (Four) Hours As Needed for Wheezing or   Shortness of Air.  What changed:  You were already taking a medication with the same name,   and this prescription was added. Make sure you understand how and when to   take each.     * azithromycin 250 MG tablet  Commonly known as:  ZITHROMAX Z-RON  Take 2 tablets the first day, then 1 tablet daily for 4 days.  What changed:  Another medication with the same name was added. Make sure   you understand how and when to take each.     * azithromycin 250 MG tablet  Commonly known as:  ZITHROMAX Z-RON  Take 2 tablets the first day, then 1 tablet daily for 4 days.  What changed:  You were already taking a medication with the same name,   and this prescription was added. Make sure you understand how and when to   take each.         * This list has 4 medication(s) that are the same as other medications   prescribed for you. Read the directions carefully, and ask your doctor or   other care provider to review them with you.            --  Documentation assistance provided by florina Angeles for Alex Marie PA-C. Information recorded by the florina was done at my direction and has been verified and validated by me.     Marixa Angeles  03/07/20 1318       Alex Marie III, PA  03/07/20 8817

## 2020-03-07 NOTE — ED NOTES
Pt ambulatory c steady gait, AAOx4, ABC's intact, NAD noted at this time. Pt discharged c belongings, prescriptions, and educational packet.        Ignacio Robert, RN  03/07/20 0425

## 2020-03-07 NOTE — ED NOTES
Pt here with cough, seen here on Paolo 3 2020 this date for same. Given inhaler Ventolin, antibx no better      Kaylin Yuen, RN  03/07/20 0001

## 2020-03-07 NOTE — ED NOTES
Fetal heart tones for baby 1 as 134 bpm and baby 2 142 bpm.       Ignacio Robert, RN  03/07/20 0123

## 2020-03-07 NOTE — ED PROVIDER NOTES
The ADAM and I have discussed this patient's history, physical exam, and treatment plan. I have reviewed the documentation and personally had a face to face interaction with the patient  I affirm the documentation and agree with the treatment and plan.  The following describes my personal findings.    The patient, who is 28 weeks pregnant ([id number]), presents complaining of an episodic dry cough for the past 3 months. This cough has worsened over the past few weeks and she notes that she has now developed wheezing and mild SOA. She was seen at initial onset of her Sx and was prescribed Ventolin and ABx, which did not improve her Sx. Denies abd pain and vaginal bleeding, states baby moving normally. Denies HA, fever, CP, chills, nausea, and vomiting. Currently followed by Dr. Baum (OB/GYN).    Limited physical exam:  Patient is nontoxic appearing in NAD.  Lungs/cardiovascular: RRR without murmur, distal pulses intact, diffuse wheezing bilaterally, no obvious respiratory distress  Abdomen: gravid, nontender  Back/extremities: no deformity, PT pulses palpable B,  No edema of lower ext    Anticipate close outpatient follow up with gynecologist for recheck and further testing/treatment as needed.    Documentation assistance provided by florina Alexander for Dr. Parker MD.  Information recorded by the scribe was done at my direction and has been verified and validated by me.         Tino Alexander  [phone]       Mary Canales MD  03/10/20 5933

## 2020-03-12 ENCOUNTER — ROUTINE PRENATAL (OUTPATIENT)
Dept: OBSTETRICS AND GYNECOLOGY | Facility: CLINIC | Age: 40
End: 2020-03-12

## 2020-03-12 ENCOUNTER — PROCEDURE VISIT (OUTPATIENT)
Dept: OBSTETRICS AND GYNECOLOGY | Facility: CLINIC | Age: 40
End: 2020-03-12

## 2020-03-12 VITALS — WEIGHT: 185 LBS | DIASTOLIC BLOOD PRESSURE: 82 MMHG | SYSTOLIC BLOOD PRESSURE: 131 MMHG | BODY MASS INDEX: 31.76 KG/M2

## 2020-03-12 DIAGNOSIS — O30.049 DICHORIONIC DIAMNIOTIC TWIN PREGNANCY, ANTEPARTUM: Primary | ICD-10-CM

## 2020-03-12 DIAGNOSIS — O30.043 DICHORIONIC DIAMNIOTIC TWIN PREGNANCY IN THIRD TRIMESTER: ICD-10-CM

## 2020-03-12 DIAGNOSIS — O09.519 ADVANCED MATERNAL AGE, PRIMIGRAVIDA, ANTEPARTUM: ICD-10-CM

## 2020-03-12 DIAGNOSIS — Z34.03 ENCOUNTER FOR SUPERVISION OF NORMAL FIRST PREGNANCY IN THIRD TRIMESTER: Primary | ICD-10-CM

## 2020-03-12 LAB
GLUCOSE UR STRIP-MCNC: NEGATIVE MG/DL
PROT UR STRIP-MCNC: NEGATIVE MG/DL

## 2020-03-12 PROCEDURE — 0502F SUBSEQUENT PRENATAL CARE: CPT | Performed by: OBSTETRICS & GYNECOLOGY

## 2020-03-12 PROCEDURE — 76816 OB US FOLLOW-UP PER FETUS: CPT | Performed by: OBSTETRICS & GYNECOLOGY

## 2020-03-12 PROCEDURE — 96372 THER/PROPH/DIAG INJ SC/IM: CPT | Performed by: OBSTETRICS & GYNECOLOGY

## 2020-03-12 NOTE — PROGRESS NOTES
CC follow-up prenatal visit.  Patient currently just finished antibiotics for pneumonia which may be viral.  She is not a lot better with wheezing on auscultation today.  No fever.  She has been referred back to her PCP for the emergency room soon to get reevaluated to see if she needs further treatment.  She does want to see if she can have her mother come here for a couple months to help after the babies are born and I will give her a note and see if this helps.  She has good fetal movement.  RhoGam today. US today of babies shows them to be both breech with good growth and normal fluid.  Patient will return to office in 1 week.

## 2020-03-19 ENCOUNTER — ROUTINE PRENATAL (OUTPATIENT)
Dept: OBSTETRICS AND GYNECOLOGY | Facility: CLINIC | Age: 40
End: 2020-03-19

## 2020-03-19 ENCOUNTER — PROCEDURE VISIT (OUTPATIENT)
Dept: OBSTETRICS AND GYNECOLOGY | Facility: CLINIC | Age: 40
End: 2020-03-19

## 2020-03-19 VITALS — DIASTOLIC BLOOD PRESSURE: 80 MMHG | WEIGHT: 185 LBS | SYSTOLIC BLOOD PRESSURE: 130 MMHG | BODY MASS INDEX: 31.76 KG/M2

## 2020-03-19 DIAGNOSIS — Z34.03 ENCOUNTER FOR SUPERVISION OF NORMAL FIRST PREGNANCY IN THIRD TRIMESTER: Primary | ICD-10-CM

## 2020-03-19 DIAGNOSIS — O30.049 DICHORIONIC DIAMNIOTIC TWIN PREGNANCY, ANTEPARTUM: Primary | ICD-10-CM

## 2020-03-19 DIAGNOSIS — O30.049 TWIN PREGNANCY, DICHORIONIC/DIAMNIOTIC, UNSPECIFIED TRIMESTER: ICD-10-CM

## 2020-03-19 DIAGNOSIS — O09.519 ADVANCED MATERNAL AGE, PRIMIGRAVIDA, ANTEPARTUM: ICD-10-CM

## 2020-03-19 DIAGNOSIS — O46.93 VAGINAL BLEEDING IN PREGNANCY, THIRD TRIMESTER: ICD-10-CM

## 2020-03-19 LAB
GLUCOSE UR STRIP-MCNC: NEGATIVE MG/DL
PROT UR STRIP-MCNC: NEGATIVE MG/DL

## 2020-03-19 PROCEDURE — 0502F SUBSEQUENT PRENATAL CARE: CPT | Performed by: OBSTETRICS & GYNECOLOGY

## 2020-03-19 PROCEDURE — 76819 FETAL BIOPHYS PROFIL W/O NST: CPT | Performed by: OBSTETRICS & GYNECOLOGY

## 2020-03-19 NOTE — PROGRESS NOTES
CC follow-up prenatal visit.  Patient given note today to help with her mother coming to this country during her postpartum period.  We will start maternity leave at 32 weeks.  Good fetal movement and growth today.  Patient feeling better and lungs are much clearer today with little wheezing.  Will get BPP ultrasound today.  Seeing MFADDY again April 1..

## 2020-03-26 ENCOUNTER — PROCEDURE VISIT (OUTPATIENT)
Dept: OBSTETRICS AND GYNECOLOGY | Facility: CLINIC | Age: 40
End: 2020-03-26

## 2020-03-26 ENCOUNTER — ROUTINE PRENATAL (OUTPATIENT)
Dept: OBSTETRICS AND GYNECOLOGY | Facility: CLINIC | Age: 40
End: 2020-03-26

## 2020-03-26 VITALS — DIASTOLIC BLOOD PRESSURE: 80 MMHG | SYSTOLIC BLOOD PRESSURE: 131 MMHG | WEIGHT: 187 LBS | BODY MASS INDEX: 32.1 KG/M2

## 2020-03-26 DIAGNOSIS — O30.043 DICHORIONIC DIAMNIOTIC TWIN PREGNANCY IN THIRD TRIMESTER: ICD-10-CM

## 2020-03-26 DIAGNOSIS — O09.513 PRIMIGRAVIDA OF ADVANCED MATERNAL AGE IN THIRD TRIMESTER: ICD-10-CM

## 2020-03-26 DIAGNOSIS — O30.049 DICHORIONIC DIAMNIOTIC TWIN PREGNANCY, ANTEPARTUM: Primary | ICD-10-CM

## 2020-03-26 DIAGNOSIS — Z34.03 PRENATAL CARE, FIRST PREGNANCY, THIRD TRIMESTER: Primary | ICD-10-CM

## 2020-03-26 DIAGNOSIS — O09.519 ADVANCED MATERNAL AGE, PRIMIGRAVIDA, ANTEPARTUM: ICD-10-CM

## 2020-03-26 LAB
GLUCOSE UR STRIP-MCNC: NEGATIVE MG/DL
PROT UR STRIP-MCNC: ABNORMAL MG/DL

## 2020-03-26 PROCEDURE — 76819 FETAL BIOPHYS PROFIL W/O NST: CPT | Performed by: OBSTETRICS & GYNECOLOGY

## 2020-03-26 PROCEDURE — 0502F SUBSEQUENT PRENATAL CARE: CPT | Performed by: OBSTETRICS & GYNECOLOGY

## 2020-03-26 NOTE — PROGRESS NOTES
31 wk twin IUP  Twins active  No ctx or bleeding  BPP 8/8 nl RUBY  ROS: allergic sx  A: Twin IUP 31 wks  P: MFM f/u next week

## 2020-03-27 ENCOUNTER — PATIENT MESSAGE (OUTPATIENT)
Dept: OBSTETRICS AND GYNECOLOGY | Facility: CLINIC | Age: 40
End: 2020-03-27

## 2020-03-30 ENCOUNTER — TELEPHONE (OUTPATIENT)
Dept: OBSTETRICS AND GYNECOLOGY | Facility: CLINIC | Age: 40
End: 2020-03-30

## 2020-03-30 NOTE — TELEPHONE ENCOUNTER
Dr. Baum,     I see in an office note she plans to start maternity leave at 32 weeks.  Was she taken off work for medical complications (I see she currently has pneumonia but that is still eight weeks prior to her ERIC) or is that just when she wants to start her leave?  I just want to make sure [facility] will approve this.      Thanks,  Patience

## 2020-03-30 NOTE — TELEPHONE ENCOUNTER
----- Message from Chelsey Soriano sent at 3/27/2020 11:50 AM EDT -----  Regarding: Non-Urgent Medical Question  Contact: [phone]  My Chart message.    ----- Message -----  From: Stephanie Rodriguez  Sent: 3/27/2020   8:57 AM EDT  To: [facility] Clinical Pool  Subject: Non-Urgent Medical Question                      Good morning, I left the documents to fill out in the office. If possible I need to extend my medical leave until 04/11/20 I don't feel much better, last night I had nightmares all night I'm worry about my health and at [employer] in my building they have confirmed at least 3 cases of covid 19. I can ask for maternity leave 4 weeks previous to pre-partum, I think will be about may 14 (38 weeks) recommended by maternal fetal medicine physician, not exceeding 38 weeks. I hope you understand my situation, and my English... I need to be off from work, and keep my babies safe from all of this. Thanks all of you.

## 2020-03-30 NOTE — TELEPHONE ENCOUNTER
Yes she does have twins and a very high risk pregnancy at age 40 so I did want a place her on limited bedrest at 32 weeks.  She also has been ill with pneumonia and a chronic upper respiratory infection.  Thank you.  REINIER

## 2020-04-01 ENCOUNTER — TRANSCRIBE ORDERS (OUTPATIENT)
Dept: OBSTETRICS AND GYNECOLOGY | Facility: CLINIC | Age: 40
End: 2020-04-01

## 2020-04-01 ENCOUNTER — HOSPITAL ENCOUNTER (OUTPATIENT)
Dept: ULTRASOUND IMAGING | Facility: HOSPITAL | Age: 40
Discharge: HOME | End: 2020-04-01
Admitting: OBSTETRICS & GYNECOLOGY

## 2020-04-01 ENCOUNTER — ROUTINE PRENATAL (OUTPATIENT)
Dept: OBSTETRICS AND GYNECOLOGY | Facility: CLINIC | Age: 40
End: 2020-04-01

## 2020-04-01 VITALS — SYSTOLIC BLOOD PRESSURE: 126 MMHG | DIASTOLIC BLOOD PRESSURE: 78 MMHG | BODY MASS INDEX: 31.76 KG/M2 | WEIGHT: 185 LBS

## 2020-04-01 DIAGNOSIS — O99.280 THYROID DYSFUNCTION IN PREGNANCY, ANTEPARTUM: Primary | ICD-10-CM

## 2020-04-01 DIAGNOSIS — O09.513 PRIMIGRAVIDA OF ADVANCED MATERNAL AGE IN THIRD TRIMESTER: ICD-10-CM

## 2020-04-01 DIAGNOSIS — O30.043 DICHORIONIC DIAMNIOTIC TWIN PREGNANCY IN THIRD TRIMESTER: ICD-10-CM

## 2020-04-01 DIAGNOSIS — E07.9 THYROID DYSFUNCTION IN PREGNANCY, ANTEPARTUM: Primary | ICD-10-CM

## 2020-04-01 DIAGNOSIS — L29.9 ITCHING: ICD-10-CM

## 2020-04-01 DIAGNOSIS — O10.919 CHRONIC HYPERTENSION AFFECTING PREGNANCY: ICD-10-CM

## 2020-04-01 DIAGNOSIS — O30.043 DICHORIONIC DIAMNIOTIC TWIN PREGNANCY IN THIRD TRIMESTER: Primary | ICD-10-CM

## 2020-04-01 LAB
GLUCOSE UR STRIP-MCNC: NEGATIVE MG/DL
PROT UR STRIP-MCNC: NEGATIVE MG/DL

## 2020-04-01 PROCEDURE — 0502F SUBSEQUENT PRENATAL CARE: CPT | Performed by: OBSTETRICS & GYNECOLOGY

## 2020-04-01 PROCEDURE — 76816 OB US FOLLOW-UP PER FETUS: CPT

## 2020-04-01 NOTE — PROGRESS NOTES
CC follow-up prenatal visit.   Good fetal movement and growth.  Having some itching of the skin so we will check a bilirubin.  Rechecking TFTs today due to history of hypothyroidism on medication.  Patient seeing MFM this afternoon for growth.  No contractions or vaginal bleeding or other issues.  No recent fevers.  Return to office 1 week.

## 2020-04-02 ENCOUNTER — TELEPHONE (OUTPATIENT)
Dept: OBSTETRICS AND GYNECOLOGY | Facility: CLINIC | Age: 40
End: 2020-04-02

## 2020-04-02 LAB
ALBUMIN SERPL-MCNC: 2.9 G/DL (ref 3.5–5.2)
ALBUMIN/GLOB SERPL: 1 G/DL
ALP SERPL-CCNC: 185 U/L (ref 39–117)
ALT SERPL-CCNC: 16 U/L (ref 1–33)
AST SERPL-CCNC: 18 U/L (ref 1–32)
BILIRUB SERPL-MCNC: 0.2 MG/DL (ref 0.2–1.2)
BUN SERPL-MCNC: 6 MG/DL (ref 6–20)
BUN/CREAT SERPL: 9 (ref 7–25)
CALCIUM SERPL-MCNC: 9.3 MG/DL (ref 8.6–10.5)
CHLORIDE SERPL-SCNC: 104 MMOL/L (ref 98–107)
CO2 SERPL-SCNC: 20.9 MMOL/L (ref 22–29)
CREAT SERPL-MCNC: 0.67 MG/DL (ref 0.57–1)
FT4I SERPL CALC-MCNC: 1 (ref 1.2–4.9)
GLOBULIN SER CALC-MCNC: 2.9 GM/DL
GLUCOSE SERPL-MCNC: 94 MG/DL (ref 65–99)
POTASSIUM SERPL-SCNC: 4 MMOL/L (ref 3.5–5.2)
PROT SERPL-MCNC: 5.8 G/DL (ref 6–8.5)
SODIUM SERPL-SCNC: 138 MMOL/L (ref 136–145)
T3RU NFR SERPL: 10 % (ref 24–39)
T4 SERPL-MCNC: 10 UG/DL (ref 4.5–12)
TSH SERPL DL<=0.005 MIU/L-ACNC: 0.88 UIU/ML (ref 0.45–4.5)

## 2020-04-09 ENCOUNTER — PROCEDURE VISIT (OUTPATIENT)
Dept: OBSTETRICS AND GYNECOLOGY | Facility: CLINIC | Age: 40
End: 2020-04-09

## 2020-04-09 ENCOUNTER — ROUTINE PRENATAL (OUTPATIENT)
Dept: OBSTETRICS AND GYNECOLOGY | Facility: CLINIC | Age: 40
End: 2020-04-09

## 2020-04-09 VITALS — WEIGHT: 190 LBS | SYSTOLIC BLOOD PRESSURE: 128 MMHG | BODY MASS INDEX: 32.61 KG/M2 | DIASTOLIC BLOOD PRESSURE: 80 MMHG

## 2020-04-09 DIAGNOSIS — O30.043 DICHORIONIC DIAMNIOTIC TWIN PREGNANCY IN THIRD TRIMESTER: ICD-10-CM

## 2020-04-09 DIAGNOSIS — O10.919 CHRONIC HYPERTENSION AFFECTING PREGNANCY: ICD-10-CM

## 2020-04-09 DIAGNOSIS — O09.523 MULTIGRAVIDA OF ADVANCED MATERNAL AGE IN THIRD TRIMESTER: ICD-10-CM

## 2020-04-09 DIAGNOSIS — Z34.03 ENCOUNTER FOR SUPERVISION OF NORMAL FIRST PREGNANCY IN THIRD TRIMESTER: Primary | ICD-10-CM

## 2020-04-09 DIAGNOSIS — O10.919 CHRONIC HYPERTENSION AFFECTING PREGNANCY: Primary | ICD-10-CM

## 2020-04-09 LAB
GLUCOSE UR STRIP-MCNC: NEGATIVE MG/DL
PROT UR STRIP-MCNC: NEGATIVE MG/DL

## 2020-04-09 PROCEDURE — 76819 FETAL BIOPHYS PROFIL W/O NST: CPT | Performed by: OBSTETRICS & GYNECOLOGY

## 2020-04-09 PROCEDURE — 0502F SUBSEQUENT PRENATAL CARE: CPT | Performed by: OBSTETRICS & GYNECOLOGY

## 2020-04-09 NOTE — PROGRESS NOTES
CC follow-up prenatal visit.  Good fetal movement.  Beginning maternity leave in a week.  Will get BPP here today and at [facility] next week as scheduled.  Chronic hypertension well controlled on Procardia XL 60.  Return to office 1 week.

## 2020-04-15 ENCOUNTER — TRANSCRIBE ORDERS (OUTPATIENT)
Dept: ULTRASOUND IMAGING | Facility: HOSPITAL | Age: 40
End: 2020-04-15

## 2020-04-15 ENCOUNTER — ROUTINE PRENATAL (OUTPATIENT)
Dept: OBSTETRICS AND GYNECOLOGY | Facility: CLINIC | Age: 40
End: 2020-04-15

## 2020-04-15 ENCOUNTER — HOSPITAL ENCOUNTER (OUTPATIENT)
Dept: ULTRASOUND IMAGING | Facility: HOSPITAL | Age: 40
Discharge: HOME | End: 2020-04-15
Admitting: OBSTETRICS & GYNECOLOGY

## 2020-04-15 VITALS — BODY MASS INDEX: 33.3 KG/M2 | DIASTOLIC BLOOD PRESSURE: 88 MMHG | WEIGHT: 194 LBS | SYSTOLIC BLOOD PRESSURE: 124 MMHG

## 2020-04-15 DIAGNOSIS — O30.043 DICHORIONIC DIAMNIOTIC TWIN PREGNANCY IN THIRD TRIMESTER: ICD-10-CM

## 2020-04-15 DIAGNOSIS — O09.513 PRIMIGRAVIDA OF ADVANCED MATERNAL AGE IN THIRD TRIMESTER: ICD-10-CM

## 2020-04-15 DIAGNOSIS — O10.919 CHRONIC HYPERTENSION AFFECTING PREGNANCY: ICD-10-CM

## 2020-04-15 DIAGNOSIS — O10.919 CHRONIC HYPERTENSION AFFECTING PREGNANCY: Primary | ICD-10-CM

## 2020-04-15 DIAGNOSIS — O30.043 DICHORIONIC DIAMNIOTIC TWIN PREGNANCY IN THIRD TRIMESTER: Primary | ICD-10-CM

## 2020-04-15 DIAGNOSIS — O13.3 GESTATIONAL HYPERTENSION, THIRD TRIMESTER: ICD-10-CM

## 2020-04-15 LAB
GLUCOSE UR STRIP-MCNC: NEGATIVE MG/DL
PROT UR STRIP-MCNC: NEGATIVE MG/DL

## 2020-04-15 PROCEDURE — 0502F SUBSEQUENT PRENATAL CARE: CPT | Performed by: OBSTETRICS & GYNECOLOGY

## 2020-04-15 PROCEDURE — 76818 FETAL BIOPHYS PROFILE W/NST: CPT

## 2020-04-15 RX ORDER — LIDOCAINE HYDROCHLORIDE 10 MG/ML
5 INJECTION, SOLUTION EPIDURAL; INFILTRATION; INTRACAUDAL; PERINEURAL AS NEEDED
Status: CANCELLED | OUTPATIENT
Start: 2020-04-15

## 2020-04-15 RX ORDER — CARBOPROST TROMETHAMINE 250 UG/ML
250 INJECTION, SOLUTION INTRAMUSCULAR AS NEEDED
Status: CANCELLED | OUTPATIENT
Start: 2020-04-15

## 2020-04-15 RX ORDER — SODIUM CHLORIDE 0.9 % (FLUSH) 0.9 %
3 SYRINGE (ML) INJECTION EVERY 12 HOURS SCHEDULED
Status: CANCELLED | OUTPATIENT
Start: 2020-04-15

## 2020-04-15 RX ORDER — MISOPROSTOL 100 UG/1
800 TABLET ORAL AS NEEDED
Status: CANCELLED | OUTPATIENT
Start: 2020-04-15

## 2020-04-15 RX ORDER — SODIUM CHLORIDE 0.9 % (FLUSH) 0.9 %
10 SYRINGE (ML) INJECTION AS NEEDED
Status: CANCELLED | OUTPATIENT
Start: 2020-04-15

## 2020-04-15 RX ORDER — METHYLERGONOVINE MALEATE 0.2 MG/ML
200 INJECTION INTRAVENOUS ONCE AS NEEDED
Status: CANCELLED | OUTPATIENT
Start: 2020-04-15

## 2020-04-15 NOTE — PROGRESS NOTES
CC follow-up prenatal visit for this 40-year-old white female at 33 weeks 6 days gestation with di-di-twins.  Patient saw our maternal-fetal medicine today and report is pending.  Good fetal movement and growth. Babies still Vtx/Breech.  BP doing fine on Procardia XL60.  Will schedule CS at 38 weeks since gestational hypertension with di-di-text twins.  Return to office 1 week here and seeing maternal-fetal medicine next week as well.  Patient realizes  can be moved up for multitude of reasons which we discussed.

## 2020-04-22 ENCOUNTER — HOSPITAL ENCOUNTER (OUTPATIENT)
Dept: ULTRASOUND IMAGING | Facility: HOSPITAL | Age: 40
Discharge: HOME | End: 2020-04-22
Admitting: OBSTETRICS & GYNECOLOGY

## 2020-04-22 DIAGNOSIS — O30.043 DICHORIONIC DIAMNIOTIC TWIN PREGNANCY IN THIRD TRIMESTER: ICD-10-CM

## 2020-04-22 DIAGNOSIS — O09.513 PRIMIGRAVIDA OF ADVANCED MATERNAL AGE IN THIRD TRIMESTER: ICD-10-CM

## 2020-04-22 DIAGNOSIS — O10.919 CHRONIC HYPERTENSION AFFECTING PREGNANCY: ICD-10-CM

## 2020-04-22 PROCEDURE — 76819 FETAL BIOPHYS PROFIL W/O NST: CPT

## 2020-04-22 PROCEDURE — 76820 UMBILICAL ARTERY ECHO: CPT

## 2020-04-23 ENCOUNTER — TELEPHONE (OUTPATIENT)
Dept: OBSTETRICS AND GYNECOLOGY | Facility: CLINIC | Age: 40
End: 2020-04-23

## 2020-04-23 ENCOUNTER — ROUTINE PRENATAL (OUTPATIENT)
Dept: OBSTETRICS AND GYNECOLOGY | Facility: CLINIC | Age: 40
End: 2020-04-23

## 2020-04-23 VITALS — BODY MASS INDEX: 33.47 KG/M2 | WEIGHT: 195 LBS | SYSTOLIC BLOOD PRESSURE: 124 MMHG | DIASTOLIC BLOOD PRESSURE: 70 MMHG

## 2020-04-23 DIAGNOSIS — Z34.03 ENCOUNTER FOR SUPERVISION OF NORMAL FIRST PREGNANCY IN THIRD TRIMESTER: Primary | ICD-10-CM

## 2020-04-23 DIAGNOSIS — I95.89 CHRONIC HYPOTENSION: ICD-10-CM

## 2020-04-23 DIAGNOSIS — O30.043 DICHORIONIC DIAMNIOTIC TWIN PREGNANCY IN THIRD TRIMESTER: ICD-10-CM

## 2020-04-23 LAB
GLUCOSE UR STRIP-MCNC: NEGATIVE MG/DL
PROT UR STRIP-MCNC: ABNORMAL MG/DL

## 2020-04-23 PROCEDURE — 0502F SUBSEQUENT PRENATAL CARE: CPT | Performed by: OBSTETRICS & GYNECOLOGY

## 2020-04-23 NOTE — PROGRESS NOTES
CC follow-up prenatal visit.  Good fetal movement and growth.  Primary  being moved up to May 12 as [facility] recommends delivery between 37 and 38 weeks due to chronic hypertension on meds and twin gestation with advanced maternal age.  Patient states she wants a  regardless of whether 1 of her babies remains breech or not.  We will also recheck her hemoglobin next week when we will do her COVID-19 antibody test as she was very ill during her pregnancy.  She in fact did have viral pneumonia in the fall.  Good fetal movement and growth.  Having BPP weekly with [facility] and returning to office here in 1 week.  GBS performed today.

## 2020-04-23 NOTE — TELEPHONE ENCOUNTER
----- Message from Patience Powell MA sent at 4/22/2020 10:45 AM EDT -----  Regarding: Non-Urgent Medical Question  Contact: [phone]  Is this something you can help with?    ----- Message -----  From: Stephanie Rodriguez  Sent: 4/22/2020  10:41 AM EDT  To: [facility] Clinical Pool  Subject: Non-Urgent Medical Question                      Good morning! I have some information I need to change, for maternal fetal medicine, this is my second pregnancy, actually is my first. I made another in vitro, but was negative to pregnancy test, so this is the first. Can you  please send the correct info to maternal fetal medicine?! Thanks! See you tomorrow. Have a good day

## 2020-04-23 NOTE — TELEPHONE ENCOUNTER
No, I cannot change information in the chart.  Dr. Baum will probably need to do that.  Pt was seen yesterday at [facility], I would assume she could communicate this while she is in office with them.

## 2020-04-25 LAB — GP B STREP DNA SPEC QL NAA+PROBE: NEGATIVE

## 2020-04-29 ENCOUNTER — HOSPITAL ENCOUNTER (OUTPATIENT)
Dept: ULTRASOUND IMAGING | Facility: HOSPITAL | Age: 40
Discharge: HOME | End: 2020-04-29
Admitting: OBSTETRICS & GYNECOLOGY

## 2020-04-29 DIAGNOSIS — O30.043 DICHORIONIC DIAMNIOTIC TWIN PREGNANCY IN THIRD TRIMESTER: ICD-10-CM

## 2020-04-29 DIAGNOSIS — O09.513 PRIMIGRAVIDA OF ADVANCED MATERNAL AGE IN THIRD TRIMESTER: ICD-10-CM

## 2020-04-29 DIAGNOSIS — O10.919 CHRONIC HYPERTENSION AFFECTING PREGNANCY: ICD-10-CM

## 2020-04-29 PROCEDURE — 76816 OB US FOLLOW-UP PER FETUS: CPT

## 2020-04-29 PROCEDURE — 76819 FETAL BIOPHYS PROFIL W/O NST: CPT

## 2020-04-29 PROCEDURE — 76820 UMBILICAL ARTERY ECHO: CPT

## 2020-04-30 ENCOUNTER — ROUTINE PRENATAL (OUTPATIENT)
Dept: OBSTETRICS AND GYNECOLOGY | Facility: CLINIC | Age: 40
End: 2020-04-30

## 2020-04-30 VITALS — BODY MASS INDEX: 33.99 KG/M2 | WEIGHT: 198 LBS | SYSTOLIC BLOOD PRESSURE: 130 MMHG | DIASTOLIC BLOOD PRESSURE: 88 MMHG

## 2020-04-30 DIAGNOSIS — O30.049 TWIN PREGNANCY, DICHORIONIC/DIAMNIOTIC, UNSPECIFIED TRIMESTER: ICD-10-CM

## 2020-04-30 DIAGNOSIS — Z34.03 ENCOUNTER FOR SUPERVISION OF NORMAL FIRST PREGNANCY IN THIRD TRIMESTER: Primary | ICD-10-CM

## 2020-04-30 DIAGNOSIS — O09.523 MULTIGRAVIDA OF ADVANCED MATERNAL AGE IN THIRD TRIMESTER: ICD-10-CM

## 2020-04-30 LAB
GLUCOSE UR STRIP-MCNC: NEGATIVE MG/DL
PROT UR STRIP-MCNC: NEGATIVE MG/DL

## 2020-04-30 PROCEDURE — 0502F SUBSEQUENT PRENATAL CARE: CPT | Performed by: OBSTETRICS & GYNECOLOGY

## 2020-04-30 NOTE — PROGRESS NOTES
CC follow-up prenatal visit.  Good fetal movement and growth.  Very occasional mild headaches only.  Will recheck hemoglobin and thyroid test today along with COVID-19 antibody test since patient did have viral pneumonia during the fall.  Saw MFM yesterday and baby's testing well.  Is having some increased swelling and although her proteinuria is negative today I will have her bring in 24-hour urine Monday and recheck her blood pressure at that visit.  Ultrasound today fetal heart tones 136 in 138 with baby A vertex and baby B breech.  Patient given warnings about when to go to labor and delivery such as worsening headaches, blood pressures increasing especially 1 40-1 50s over 90s, etc.

## 2020-05-01 LAB
BASOPHILS # BLD AUTO: 0.04 10*3/MM3 (ref 0–0.2)
BASOPHILS NFR BLD AUTO: 0.5 % (ref 0–1.5)
EOSINOPHIL # BLD AUTO: 0.28 10*3/MM3 (ref 0–0.4)
EOSINOPHIL NFR BLD AUTO: 3.2 % (ref 0.3–6.2)
ERYTHROCYTE [DISTWIDTH] IN BLOOD BY AUTOMATED COUNT: 13.2 % (ref 12.3–15.4)
FT4I SERPL CALC-MCNC: 0.7 (ref 1.2–4.9)
HCT VFR BLD AUTO: 34.5 % (ref 34–46.6)
HGB BLD-MCNC: 11.8 G/DL (ref 12–15.9)
IMM GRANULOCYTES # BLD AUTO: 0.08 10*3/MM3 (ref 0–0.05)
IMM GRANULOCYTES NFR BLD AUTO: 0.9 % (ref 0–0.5)
LYMPHOCYTES # BLD AUTO: 1.09 10*3/MM3 (ref 0.7–3.1)
LYMPHOCYTES NFR BLD AUTO: 12.4 % (ref 19.6–45.3)
MCH RBC QN AUTO: 31.6 PG (ref 26.6–33)
MCHC RBC AUTO-ENTMCNC: 34.2 G/DL (ref 31.5–35.7)
MCV RBC AUTO: 92.5 FL (ref 79–97)
MONOCYTES # BLD AUTO: 0.7 10*3/MM3 (ref 0.1–0.9)
MONOCYTES NFR BLD AUTO: 8 % (ref 5–12)
NEUTROPHILS # BLD AUTO: 6.58 10*3/MM3 (ref 1.7–7)
NEUTROPHILS NFR BLD AUTO: 75 % (ref 42.7–76)
NRBC BLD AUTO-RTO: 0 /100 WBC (ref 0–0.2)
PLATELET # BLD AUTO: 246 10*3/MM3 (ref 140–450)
RBC # BLD AUTO: 3.73 10*6/MM3 (ref 3.77–5.28)
SARS-COV-2 IGG SERPL QL IA: NEGATIVE
T3RU NFR SERPL: 8 % (ref 24–39)
T4 SERPL-MCNC: 9.2 UG/DL (ref 4.5–12)
TSH SERPL DL<=0.005 MIU/L-ACNC: 3.2 UIU/ML (ref 0.45–4.5)
WBC # BLD AUTO: 8.77 10*3/MM3 (ref 3.4–10.8)

## 2020-05-04 ENCOUNTER — ROUTINE PRENATAL (OUTPATIENT)
Dept: OBSTETRICS AND GYNECOLOGY | Facility: CLINIC | Age: 40
End: 2020-05-04

## 2020-05-04 ENCOUNTER — PROCEDURE VISIT (OUTPATIENT)
Dept: OBSTETRICS AND GYNECOLOGY | Facility: CLINIC | Age: 40
End: 2020-05-04

## 2020-05-04 VITALS — SYSTOLIC BLOOD PRESSURE: 130 MMHG | BODY MASS INDEX: 34.67 KG/M2 | DIASTOLIC BLOOD PRESSURE: 80 MMHG | WEIGHT: 202 LBS

## 2020-05-04 DIAGNOSIS — Z34.03 ENCOUNTER FOR SUPERVISION OF NORMAL FIRST PREGNANCY IN THIRD TRIMESTER: Primary | ICD-10-CM

## 2020-05-04 DIAGNOSIS — O30.049 TWIN PREGNANCY, DICHORIONIC/DIAMNIOTIC, UNSPECIFIED TRIMESTER: ICD-10-CM

## 2020-05-04 DIAGNOSIS — O10.919 CHRONIC HYPERTENSION AFFECTING PREGNANCY: ICD-10-CM

## 2020-05-04 DIAGNOSIS — O30.043 DICHORIONIC DIAMNIOTIC TWIN PREGNANCY IN THIRD TRIMESTER: Primary | ICD-10-CM

## 2020-05-04 LAB
GLUCOSE UR STRIP-MCNC: NEGATIVE MG/DL
PROT UR STRIP-MCNC: NEGATIVE MG/DL

## 2020-05-04 PROCEDURE — 76819 FETAL BIOPHYS PROFIL W/O NST: CPT | Performed by: OBSTETRICS & GYNECOLOGY

## 2020-05-04 PROCEDURE — 0502F SUBSEQUENT PRENATAL CARE: CPT | Performed by: OBSTETRICS & GYNECOLOGY

## 2020-05-04 NOTE — PROGRESS NOTES
CC follow-up prenatal visit.  BPP today 8/8 x 2.  Vertex breech presentation.  Normal RUBY.  COVID antibody test negative.  Hemoglobin 11.8.  TFTs normal.  Good fetal movement and growth.  Will move primary  up to Thursday afternoon at 430 as she will be 37 weeks at that time as suggested by MFM.  Patient did bring in 24-hour urine today for creatinine clearance and protein.

## 2020-05-05 ENCOUNTER — DOCUMENTATION (OUTPATIENT)
Dept: OBSTETRICS AND GYNECOLOGY | Facility: CLINIC | Age: 40
End: 2020-05-05

## 2020-05-05 ENCOUNTER — LAB (OUTPATIENT)
Dept: LAB | Facility: HOSPITAL | Age: 40
End: 2020-05-05

## 2020-05-05 DIAGNOSIS — Z34.03 ENCOUNTER FOR SUPERVISION OF NORMAL FIRST PREGNANCY IN THIRD TRIMESTER: ICD-10-CM

## 2020-05-05 LAB
CREAT 24H UR-MRATE: 844 MG/24 HR (ref 800–1800)
CREAT CL 24H UR+SERPL-VRATE: 68 ML/MIN (ref 88–128)
CREAT SERPL-MCNC: 0.86 MG/DL (ref 0.57–1)
CREAT UR-MCNC: 73.1 MG/DL
PROT 24H UR-MRATE: 531 MG/24 HR (ref 30–150)
PROT UR-MCNC: 46 MG/DL

## 2020-05-05 PROCEDURE — U0002 COVID-19 LAB TEST NON-CDC: HCPCS

## 2020-05-05 NOTE — PROGRESS NOTES
H&P Note    Patient Identification:  Name: Stephanie Rodriguez  Age: 40 y.o.  Sex: female  :  1980  MRN: [id number]                       Chief Complaint:  Vtx- Breech twins with chronic high blood pressure controlled on meds    History of Present Illness:   39 yo [id number]  female with EDC by Donor oocyte IVF implantation dating 20, being admitted for Primary  at 36 6/7 weeks with Vtx/Breech Sonal Twins, AMA, and medication controlled chronic hypertension now with superimposed PIH following elevated 24 hour urine protein. Pt without any recent headaches, BP above 140/90, etc. Pt also on Synthroid for hypothyroidism.     Problem List:  [unfilled]  Past Medical History:  Past Medical History:   Diagnosis Date   • Disease of thyroid gland    • Hepatitis    • Hypertension      Past Surgical History:  Past Surgical History:   Procedure Laterality Date   • BREAST AUGMENTATION     • OVARIAN CYST SURGERY        Home Meds:    (Not in a hospital admission)  Current Meds:   [unfilled]  Allergies:  No Known Allergies  Immunizations:  Immunization History   Administered Date(s) Administered   • Rho (D) Immune Globulin 2020   • flucelvax quad pfs =>4 YRS 2019     Social History:   Social History     Tobacco Use   • Smoking status: Never Smoker   Substance Use Topics   • Alcohol use: No     Frequency: Never      Family History:  Family History   Problem Relation Age of Onset   • Ovarian cancer Maternal Aunt    • Birth defects Neg Hx         Review of Systems  Pertinent items are noted in HPI.    Objective:  tMax 24 hrs: @TMAX(24)@  Vitals Ranges:      Intake and Output Last 3 Shifts:   [unfilled]    Exam:     General Appearance:    Alert, cooperative, no distress, appears stated age   Head:    Normocephalic, without obvious abnormality, atraumatic   Back:     Symmetric, no curvature, ROM normal, no CVA tenderness   Lungs:     Clear to auscultation bilaterally, respirations unlabored    Chest Wall:    No tenderness or deformity    Heart:    Regular rate and rhythm, S1 and S2 normal, no murmur, rub   or gallop       Abdomen:     Soft, non-tender, bowel sounds active all four quadrants,     no masses, no organomegaly   Genitalia:    Normal female without lesion, discharge or tenderness. Cx closed     Rectal:  deferred   Extremities:   Extremities normal, atraumatic, no cyanosis or edema   Skin:   Skin color, texture, turgor normal, no rashes or lesions           Data Review:  O Neg   Rt Immune    GBS Neg   Normal TFTs   Lab Results (last 24 hours)     ** No results found for the last 24 hours. **        Assessment:    * No active hospital problems. *      1. 36 6/7 weeks withVtx/Breech  Sonal Invitro Twins, AMA, Chronic hypertension with superimposed PIH, HYpothyroidism    Plan:  1. Planning Primary  in      Chad Baum MD  2020

## 2020-05-05 NOTE — H&P (VIEW-ONLY)
H&P Note    Patient Identification:  Name: Stephanie Rodriguez  Age: 40 y.o.  Sex: female  :  1980  MRN: [id number]                       Chief Complaint:  Vtx- Breech twins with chronic high blood pressure controlled on meds    History of Present Illness:   41 yo [id number]  female with EDC by Donor oocyte IVF implantation dating 20, being admitted for Primary  at 36 6/7 weeks with Vtx/Breech Sonal Twins, AMA, and medication controlled chronic hypertension now with superimposed PIH following elevated 24 hour urine protein. Pt without any recent headaches, BP above 140/90, etc. Pt also on Synthroid for hypothyroidism.     Problem List:  [unfilled]  Past Medical History:  Past Medical History:   Diagnosis Date   • Disease of thyroid gland    • Hepatitis    • Hypertension      Past Surgical History:  Past Surgical History:   Procedure Laterality Date   • BREAST AUGMENTATION     • OVARIAN CYST SURGERY        Home Meds:    (Not in a hospital admission)  Current Meds:   [unfilled]  Allergies:  No Known Allergies  Immunizations:  Immunization History   Administered Date(s) Administered   • Rho (D) Immune Globulin 2020   • flucelvax quad pfs =>4 YRS 2019     Social History:   Social History     Tobacco Use   • Smoking status: Never Smoker   Substance Use Topics   • Alcohol use: No     Frequency: Never      Family History:  Family History   Problem Relation Age of Onset   • Ovarian cancer Maternal Aunt    • Birth defects Neg Hx         Review of Systems  Pertinent items are noted in HPI.    Objective:  tMax 24 hrs: @TMAX(24)@  Vitals Ranges:      Intake and Output Last 3 Shifts:   [unfilled]    Exam:     General Appearance:    Alert, cooperative, no distress, appears stated age   Head:    Normocephalic, without obvious abnormality, atraumatic   Back:     Symmetric, no curvature, ROM normal, no CVA tenderness   Lungs:     Clear to auscultation bilaterally, respirations unlabored    Chest Wall:    No tenderness or deformity    Heart:    Regular rate and rhythm, S1 and S2 normal, no murmur, rub   or gallop       Abdomen:     Soft, non-tender, bowel sounds active all four quadrants,     no masses, no organomegaly   Genitalia:    Normal female without lesion, discharge or tenderness. Cx closed     Rectal:  deferred   Extremities:   Extremities normal, atraumatic, no cyanosis or edema   Skin:   Skin color, texture, turgor normal, no rashes or lesions           Data Review:  O Neg   Rt Immune    GBS Neg   Normal TFTs   Lab Results (last 24 hours)     ** No results found for the last 24 hours. **        Assessment:    * No active hospital problems. *      1. 36 6/7 weeks withVtx/Breech  Sonal Invitro Twins, AMA, Chronic hypertension with superimposed PIH, HYpothyroidism    Plan:  1. Planning Primary  in      Chad Baum MD  2020

## 2020-05-06 ENCOUNTER — ANESTHESIA (OUTPATIENT)
Dept: LABOR AND DELIVERY | Facility: HOSPITAL | Age: 40
End: 2020-05-06

## 2020-05-06 ENCOUNTER — ANESTHESIA EVENT (OUTPATIENT)
Dept: LABOR AND DELIVERY | Facility: HOSPITAL | Age: 40
End: 2020-05-06

## 2020-05-06 ENCOUNTER — HOSPITAL ENCOUNTER (INPATIENT)
Facility: HOSPITAL | Age: 40
LOS: 3 days | Discharge: HOME | End: 2020-05-09
Attending: OBSTETRICS & GYNECOLOGY | Admitting: OBSTETRICS & GYNECOLOGY

## 2020-05-06 ENCOUNTER — HOSPITAL ENCOUNTER (OUTPATIENT)
Dept: ULTRASOUND IMAGING | Facility: HOSPITAL | Age: 40
Discharge: HOME | End: 2020-05-06

## 2020-05-06 DIAGNOSIS — Z98.891 S/P CESAREAN SECTION: Primary | ICD-10-CM

## 2020-05-06 LAB
ABO GROUP BLD: NORMAL
ABO GROUP BLD: NORMAL
ALBUMIN SERPL-MCNC: 2.8 G/DL (ref 3.5–5.2)
ALBUMIN/GLOB SERPL: 0.9 G/DL
ALP SERPL-CCNC: 222 U/L (ref 39–117)
ALT SERPL W P-5'-P-CCNC: 12 U/L (ref 1–33)
ANION GAP SERPL CALCULATED.3IONS-SCNC: 12.8 MMOL/L (ref 5–15)
APTT PPP: 28.5 SECONDS (ref 22.7–35.4)
AST SERPL-CCNC: 23 U/L (ref 1–32)
BASOPHILS # BLD AUTO: 0.03 10*3/MM3 (ref 0–0.2)
BASOPHILS NFR BLD AUTO: 0.3 % (ref 0–1.5)
BILIRUB SERPL-MCNC: 0.3 MG/DL (ref 0.2–1.2)
BLD GP AB SCN SERPL QL: POSITIVE
BUN BLD-MCNC: 13 MG/DL (ref 6–20)
BUN/CREAT SERPL: 16.7 (ref 7–25)
CALCIUM SPEC-SCNC: 8.8 MG/DL (ref 8.6–10.5)
CHLORIDE SERPL-SCNC: 104 MMOL/L (ref 98–107)
CO2 SERPL-SCNC: 20.2 MMOL/L (ref 22–29)
CREAT BLD-MCNC: 0.78 MG/DL (ref 0.57–1)
DEPRECATED RDW RBC AUTO: 44.2 FL (ref 37–54)
DEPRECATED RDW RBC AUTO: 45.2 FL (ref 37–54)
DEPRECATED RDW RBC AUTO: 45.5 FL (ref 37–54)
EOSINOPHIL # BLD AUTO: 0.16 10*3/MM3 (ref 0–0.4)
EOSINOPHIL NFR BLD AUTO: 1.8 % (ref 0.3–6.2)
ERYTHROCYTE [DISTWIDTH] IN BLOOD BY AUTOMATED COUNT: 13.3 % (ref 12.3–15.4)
ERYTHROCYTE [DISTWIDTH] IN BLOOD BY AUTOMATED COUNT: 13.4 % (ref 12.3–15.4)
ERYTHROCYTE [DISTWIDTH] IN BLOOD BY AUTOMATED COUNT: 13.5 % (ref 12.3–15.4)
FETAL BLEED: NEGATIVE
FIBRINOGEN PPP-MCNC: 342 MG/DL (ref 219–464)
GFR SERPL CREATININE-BSD FRML MDRD: 81 ML/MIN/1.73
GLOBULIN UR ELPH-MCNC: 3.1 GM/DL
GLUCOSE BLD-MCNC: 89 MG/DL (ref 65–99)
HCT VFR BLD AUTO: 23.5 % (ref 34–46.6)
HCT VFR BLD AUTO: 24 % (ref 34–46.6)
HCT VFR BLD AUTO: 32.9 % (ref 34–46.6)
HGB BLD-MCNC: 11.3 G/DL (ref 12–15.9)
HGB BLD-MCNC: 8 G/DL (ref 12–15.9)
HGB BLD-MCNC: 8.3 G/DL (ref 12–15.9)
IMM GRANULOCYTES # BLD AUTO: 0.08 10*3/MM3 (ref 0–0.05)
IMM GRANULOCYTES NFR BLD AUTO: 0.9 % (ref 0–0.5)
INR PPP: 1.03 (ref 0.9–1.1)
LYMPHOCYTES # BLD AUTO: 0.78 10*3/MM3 (ref 0.7–3.1)
LYMPHOCYTES NFR BLD AUTO: 8.9 % (ref 19.6–45.3)
MCH RBC QN AUTO: 31.4 PG (ref 26.6–33)
MCH RBC QN AUTO: 31.7 PG (ref 26.6–33)
MCH RBC QN AUTO: 31.7 PG (ref 26.6–33)
MCHC RBC AUTO-ENTMCNC: 34 G/DL (ref 31.5–35.7)
MCHC RBC AUTO-ENTMCNC: 34.3 G/DL (ref 31.5–35.7)
MCHC RBC AUTO-ENTMCNC: 34.6 G/DL (ref 31.5–35.7)
MCV RBC AUTO: 91.4 FL (ref 79–97)
MCV RBC AUTO: 91.6 FL (ref 79–97)
MCV RBC AUTO: 93.3 FL (ref 79–97)
MONOCYTES # BLD AUTO: 0.72 10*3/MM3 (ref 0.1–0.9)
MONOCYTES NFR BLD AUTO: 8.2 % (ref 5–12)
NEUTROPHILS # BLD AUTO: 7.02 10*3/MM3 (ref 1.7–7)
NEUTROPHILS NFR BLD AUTO: 79.9 % (ref 42.7–76)
NRBC BLD AUTO-RTO: 0.1 /100 WBC (ref 0–0.2)
NUMBER OF DOSES: NORMAL
PLATELET # BLD AUTO: 201 10*3/MM3 (ref 140–450)
PLATELET # BLD AUTO: 203 10*3/MM3 (ref 140–450)
PLATELET # BLD AUTO: 210 10*3/MM3 (ref 140–450)
PMV BLD AUTO: 10.3 FL (ref 6–12)
PMV BLD AUTO: 10.5 FL (ref 6–12)
PMV BLD AUTO: 9.9 FL (ref 6–12)
POTASSIUM BLD-SCNC: 4.2 MMOL/L (ref 3.5–5.2)
PROT SERPL-MCNC: 5.9 G/DL (ref 6–8.5)
PROTHROMBIN TIME: 13.2 SECONDS (ref 11.7–14.2)
RBC # BLD AUTO: 2.52 10*6/MM3 (ref 3.77–5.28)
RBC # BLD AUTO: 2.62 10*6/MM3 (ref 3.77–5.28)
RBC # BLD AUTO: 3.6 10*6/MM3 (ref 3.77–5.28)
RESIDUAL RHIG DETECTED: NORMAL
RH BLD: NEGATIVE
RH BLD: NEGATIVE
SODIUM BLD-SCNC: 137 MMOL/L (ref 136–145)
T&S EXPIRATION DATE: NORMAL
WBC NRBC COR # BLD: 10.33 10*3/MM3 (ref 3.4–10.8)
WBC NRBC COR # BLD: 8.79 10*3/MM3 (ref 3.4–10.8)
WBC NRBC COR # BLD: 9.67 10*3/MM3 (ref 3.4–10.8)

## 2020-05-06 PROCEDURE — 59510 CESAREAN DELIVERY: CPT | Performed by: OBSTETRICS & GYNECOLOGY

## 2020-05-06 PROCEDURE — 25010000002 MORPHINE PER 10 MG: Performed by: ANESTHESIOLOGY

## 2020-05-06 PROCEDURE — 86850 RBC ANTIBODY SCREEN: CPT | Performed by: OBSTETRICS & GYNECOLOGY

## 2020-05-06 PROCEDURE — 86920 COMPATIBILITY TEST SPIN: CPT

## 2020-05-06 PROCEDURE — 86900 BLOOD TYPING SEROLOGIC ABO: CPT | Performed by: OBSTETRICS & GYNECOLOGY

## 2020-05-06 PROCEDURE — 80053 COMPREHEN METABOLIC PANEL: CPT | Performed by: OBSTETRICS & GYNECOLOGY

## 2020-05-06 PROCEDURE — 87635 SARS-COV-2 COVID-19 AMP PRB: CPT | Performed by: OBSTETRICS & GYNECOLOGY

## 2020-05-06 PROCEDURE — 86870 RBC ANTIBODY IDENTIFICATION: CPT | Performed by: OBSTETRICS & GYNECOLOGY

## 2020-05-06 PROCEDURE — 86901 BLOOD TYPING SEROLOGIC RH(D): CPT | Performed by: OBSTETRICS & GYNECOLOGY

## 2020-05-06 PROCEDURE — 85025 COMPLETE CBC W/AUTO DIFF WBC: CPT | Performed by: OBSTETRICS & GYNECOLOGY

## 2020-05-06 PROCEDURE — 25010000002 ONDANSETRON PER 1 MG

## 2020-05-06 PROCEDURE — 25010000002 RHO D IMMUNE GLOBULIN 1500 UNIT/2ML SOLUTION PREFILLED SYRINGE: Performed by: OBSTETRICS & GYNECOLOGY

## 2020-05-06 PROCEDURE — 85027 COMPLETE CBC AUTOMATED: CPT | Performed by: OBSTETRICS & GYNECOLOGY

## 2020-05-06 PROCEDURE — 25010000002 PHENYLEPHRINE PER 1 ML: Performed by: REGISTERED NURSE

## 2020-05-06 PROCEDURE — 86900 BLOOD TYPING SEROLOGIC ABO: CPT

## 2020-05-06 PROCEDURE — 85730 THROMBOPLASTIN TIME PARTIAL: CPT | Performed by: OBSTETRICS & GYNECOLOGY

## 2020-05-06 PROCEDURE — 25010000002 ONDANSETRON PER 1 MG: Performed by: ANESTHESIOLOGY

## 2020-05-06 PROCEDURE — 25010000003 CEFAZOLIN IN DEXTROSE 2-4 GM/100ML-% SOLUTION: Performed by: OBSTETRICS & GYNECOLOGY

## 2020-05-06 PROCEDURE — 85384 FIBRINOGEN ACTIVITY: CPT | Performed by: OBSTETRICS & GYNECOLOGY

## 2020-05-06 PROCEDURE — 86901 BLOOD TYPING SEROLOGIC RH(D): CPT

## 2020-05-06 PROCEDURE — 25010000002 KETOROLAC TROMETHAMINE PER 15 MG: Performed by: REGISTERED NURSE

## 2020-05-06 PROCEDURE — 85461 HEMOGLOBIN FETAL: CPT | Performed by: OBSTETRICS & GYNECOLOGY

## 2020-05-06 PROCEDURE — 88307 TISSUE EXAM BY PATHOLOGIST: CPT

## 2020-05-06 PROCEDURE — 85610 PROTHROMBIN TIME: CPT | Performed by: OBSTETRICS & GYNECOLOGY

## 2020-05-06 RX ORDER — FENTANYL CITRATE 50 UG/ML
50 INJECTION, SOLUTION INTRAMUSCULAR; INTRAVENOUS
Status: DISCONTINUED | OUTPATIENT
Start: 2020-05-06 | End: 2020-05-09 | Stop reason: HOSPADM

## 2020-05-06 RX ORDER — SODIUM CHLORIDE 0.9 % (FLUSH) 0.9 %
10 SYRINGE (ML) INJECTION AS NEEDED
Status: DISCONTINUED | OUTPATIENT
Start: 2020-05-06 | End: 2020-05-06 | Stop reason: HOSPADM

## 2020-05-06 RX ORDER — HYDROMORPHONE HYDROCHLORIDE 1 MG/ML
0.5 INJECTION, SOLUTION INTRAMUSCULAR; INTRAVENOUS; SUBCUTANEOUS
Status: DISCONTINUED | OUTPATIENT
Start: 2020-05-06 | End: 2020-05-09 | Stop reason: HOSPADM

## 2020-05-06 RX ORDER — ERYTHROMYCIN 5 MG/G
OINTMENT OPHTHALMIC
Status: DISPENSED
Start: 2020-05-06 | End: 2020-05-06

## 2020-05-06 RX ORDER — DOCUSATE SODIUM 100 MG/1
100 CAPSULE, LIQUID FILLED ORAL 2 TIMES DAILY PRN
Status: DISCONTINUED | OUTPATIENT
Start: 2020-05-06 | End: 2020-05-09 | Stop reason: HOSPADM

## 2020-05-06 RX ORDER — ONDANSETRON 2 MG/ML
4 INJECTION INTRAMUSCULAR; INTRAVENOUS ONCE AS NEEDED
Status: DISCONTINUED | OUTPATIENT
Start: 2020-05-06 | End: 2020-05-09 | Stop reason: HOSPADM

## 2020-05-06 RX ORDER — ACETAMINOPHEN 325 MG/1
650 TABLET ORAL ONCE AS NEEDED
Status: COMPLETED | OUTPATIENT
Start: 2020-05-06 | End: 2020-05-08

## 2020-05-06 RX ORDER — DIPHENHYDRAMINE HYDROCHLORIDE 50 MG/ML
12.5 INJECTION INTRAMUSCULAR; INTRAVENOUS
Status: DISCONTINUED | OUTPATIENT
Start: 2020-05-06 | End: 2020-05-09 | Stop reason: HOSPADM

## 2020-05-06 RX ORDER — MORPHINE SULFATE 2 MG/ML
2 INJECTION, SOLUTION INTRAMUSCULAR; INTRAVENOUS
Status: ACTIVE | OUTPATIENT
Start: 2020-05-06 | End: 2020-05-07

## 2020-05-06 RX ORDER — LABETALOL HYDROCHLORIDE 5 MG/ML
5 INJECTION, SOLUTION INTRAVENOUS
Status: DISCONTINUED | OUTPATIENT
Start: 2020-05-06 | End: 2020-05-09 | Stop reason: HOSPADM

## 2020-05-06 RX ORDER — DIPHENHYDRAMINE HCL 25 MG
25 CAPSULE ORAL
Status: DISCONTINUED | OUTPATIENT
Start: 2020-05-06 | End: 2020-05-09 | Stop reason: HOSPADM

## 2020-05-06 RX ORDER — METHYLERGONOVINE MALEATE 0.2 MG/ML
200 INJECTION INTRAVENOUS ONCE AS NEEDED
Status: DISCONTINUED | OUTPATIENT
Start: 2020-05-06 | End: 2020-05-06 | Stop reason: HOSPADM

## 2020-05-06 RX ORDER — SIMETHICONE 80 MG
80 TABLET,CHEWABLE ORAL 4 TIMES DAILY PRN
Status: DISCONTINUED | OUTPATIENT
Start: 2020-05-06 | End: 2020-05-09 | Stop reason: HOSPADM

## 2020-05-06 RX ORDER — BISACODYL 10 MG
10 SUPPOSITORY, RECTAL RECTAL DAILY PRN
Status: DISCONTINUED | OUTPATIENT
Start: 2020-05-06 | End: 2020-05-09 | Stop reason: HOSPADM

## 2020-05-06 RX ORDER — DIPHENHYDRAMINE HYDROCHLORIDE 50 MG/ML
25 INJECTION INTRAMUSCULAR; INTRAVENOUS EVERY 4 HOURS PRN
Status: DISCONTINUED | OUTPATIENT
Start: 2020-05-06 | End: 2020-05-09 | Stop reason: HOSPADM

## 2020-05-06 RX ORDER — LIDOCAINE HYDROCHLORIDE 10 MG/ML
5 INJECTION, SOLUTION EPIDURAL; INFILTRATION; INTRACAUDAL; PERINEURAL AS NEEDED
Status: DISCONTINUED | OUTPATIENT
Start: 2020-05-06 | End: 2020-05-06 | Stop reason: HOSPADM

## 2020-05-06 RX ORDER — OXYTOCIN-SODIUM CHLORIDE 0.9% IV SOLN 30 UNIT/500ML 30-0.9/5 UT/ML-%
125 SOLUTION INTRAVENOUS CONTINUOUS PRN
Status: COMPLETED | OUTPATIENT
Start: 2020-05-06 | End: 2020-05-06

## 2020-05-06 RX ORDER — EPHEDRINE SULFATE 50 MG/ML
5 INJECTION, SOLUTION INTRAVENOUS ONCE AS NEEDED
Status: DISCONTINUED | OUTPATIENT
Start: 2020-05-06 | End: 2020-05-09 | Stop reason: HOSPADM

## 2020-05-06 RX ORDER — LANOLIN
CREAM (ML) TOPICAL
Status: DISCONTINUED | OUTPATIENT
Start: 2020-05-06 | End: 2020-05-09 | Stop reason: HOSPADM

## 2020-05-06 RX ORDER — ONDANSETRON 4 MG/1
4 TABLET, FILM COATED ORAL EVERY 8 HOURS PRN
Status: DISCONTINUED | OUTPATIENT
Start: 2020-05-06 | End: 2020-05-09 | Stop reason: HOSPADM

## 2020-05-06 RX ORDER — PROMETHAZINE HYDROCHLORIDE 25 MG/ML
6.25 INJECTION, SOLUTION INTRAMUSCULAR; INTRAVENOUS
Status: DISCONTINUED | OUTPATIENT
Start: 2020-05-06 | End: 2020-05-09 | Stop reason: HOSPADM

## 2020-05-06 RX ORDER — HYDROCODONE BITARTRATE AND ACETAMINOPHEN 7.5; 325 MG/1; MG/1
1 TABLET ORAL ONCE AS NEEDED
Status: DISCONTINUED | OUTPATIENT
Start: 2020-05-06 | End: 2020-05-09 | Stop reason: HOSPADM

## 2020-05-06 RX ORDER — HYDRALAZINE HYDROCHLORIDE 20 MG/ML
5 INJECTION INTRAMUSCULAR; INTRAVENOUS
Status: DISCONTINUED | OUTPATIENT
Start: 2020-05-06 | End: 2020-05-09 | Stop reason: HOSPADM

## 2020-05-06 RX ORDER — EPHEDRINE SULFATE 50 MG/ML
INJECTION, SOLUTION INTRAVENOUS AS NEEDED
Status: DISCONTINUED | OUTPATIENT
Start: 2020-05-06 | End: 2020-05-06 | Stop reason: SURG

## 2020-05-06 RX ORDER — SODIUM CHLORIDE, SODIUM LACTATE, POTASSIUM CHLORIDE, CALCIUM CHLORIDE 600; 310; 30; 20 MG/100ML; MG/100ML; MG/100ML; MG/100ML
125 INJECTION, SOLUTION INTRAVENOUS CONTINUOUS
Status: DISCONTINUED | OUTPATIENT
Start: 2020-05-06 | End: 2020-05-09 | Stop reason: HOSPADM

## 2020-05-06 RX ORDER — ONDANSETRON 2 MG/ML
4 INJECTION INTRAMUSCULAR; INTRAVENOUS EVERY 6 HOURS PRN
Status: DISCONTINUED | OUTPATIENT
Start: 2020-05-06 | End: 2020-05-09 | Stop reason: HOSPADM

## 2020-05-06 RX ORDER — ACETAMINOPHEN 500 MG
1000 TABLET ORAL ONCE
Status: COMPLETED | OUTPATIENT
Start: 2020-05-06 | End: 2020-05-06

## 2020-05-06 RX ORDER — ONDANSETRON 2 MG/ML
INJECTION INTRAMUSCULAR; INTRAVENOUS
Status: COMPLETED
Start: 2020-05-06 | End: 2020-05-06

## 2020-05-06 RX ORDER — PROMETHAZINE HYDROCHLORIDE 25 MG/1
25 TABLET ORAL ONCE AS NEEDED
Status: DISCONTINUED | OUTPATIENT
Start: 2020-05-06 | End: 2020-05-09 | Stop reason: HOSPADM

## 2020-05-06 RX ORDER — ONDANSETRON 2 MG/ML
4 INJECTION INTRAMUSCULAR; INTRAVENOUS ONCE AS NEEDED
Status: ACTIVE | OUTPATIENT
Start: 2020-05-06 | End: 2020-05-07

## 2020-05-06 RX ORDER — PROMETHAZINE HYDROCHLORIDE 25 MG/ML
12.5 INJECTION, SOLUTION INTRAMUSCULAR; INTRAVENOUS ONCE AS NEEDED
Status: DISCONTINUED | OUTPATIENT
Start: 2020-05-06 | End: 2020-05-09 | Stop reason: HOSPADM

## 2020-05-06 RX ORDER — CEFAZOLIN SODIUM 2 G/100ML
2 INJECTION, SOLUTION INTRAVENOUS ONCE
Status: COMPLETED | OUTPATIENT
Start: 2020-05-06 | End: 2020-05-06

## 2020-05-06 RX ORDER — KETOROLAC TROMETHAMINE 30 MG/ML
INJECTION, SOLUTION INTRAMUSCULAR; INTRAVENOUS AS NEEDED
Status: DISCONTINUED | OUTPATIENT
Start: 2020-05-06 | End: 2020-05-06 | Stop reason: SURG

## 2020-05-06 RX ORDER — NIFEDIPINE 60 MG/1
60 TABLET, EXTENDED RELEASE ORAL DAILY
Status: DISCONTINUED | OUTPATIENT
Start: 2020-05-06 | End: 2020-05-09 | Stop reason: HOSPADM

## 2020-05-06 RX ORDER — MISOPROSTOL 200 UG/1
800 TABLET ORAL AS NEEDED
Status: DISCONTINUED | OUTPATIENT
Start: 2020-05-06 | End: 2020-05-06 | Stop reason: HOSPADM

## 2020-05-06 RX ORDER — LEVOTHYROXINE SODIUM 0.12 MG/1
125 TABLET ORAL
Status: DISCONTINUED | OUTPATIENT
Start: 2020-05-07 | End: 2020-05-09 | Stop reason: HOSPADM

## 2020-05-06 RX ORDER — DIPHENHYDRAMINE HCL 25 MG
25 CAPSULE ORAL EVERY 4 HOURS PRN
Status: DISCONTINUED | OUTPATIENT
Start: 2020-05-06 | End: 2020-05-09 | Stop reason: HOSPADM

## 2020-05-06 RX ORDER — BUPIVACAINE HYDROCHLORIDE 7.5 MG/ML
INJECTION, SOLUTION EPIDURAL; RETROBULBAR AS NEEDED
Status: DISCONTINUED | OUTPATIENT
Start: 2020-05-06 | End: 2020-05-06 | Stop reason: SURG

## 2020-05-06 RX ORDER — FAMOTIDINE 10 MG/ML
20 INJECTION, SOLUTION INTRAVENOUS ONCE
Status: COMPLETED | OUTPATIENT
Start: 2020-05-06 | End: 2020-05-06

## 2020-05-06 RX ORDER — NALOXONE HCL 0.4 MG/ML
0.2 VIAL (ML) INJECTION
Status: DISCONTINUED | OUTPATIENT
Start: 2020-05-06 | End: 2020-05-09 | Stop reason: HOSPADM

## 2020-05-06 RX ORDER — PHYTONADIONE 1 MG/.5ML
INJECTION, EMULSION INTRAMUSCULAR; INTRAVENOUS; SUBCUTANEOUS
Status: DISPENSED
Start: 2020-05-06 | End: 2020-05-06

## 2020-05-06 RX ORDER — OXYCODONE HYDROCHLORIDE AND ACETAMINOPHEN 5; 325 MG/1; MG/1
2 TABLET ORAL EVERY 4 HOURS PRN
Status: DISCONTINUED | OUTPATIENT
Start: 2020-05-06 | End: 2020-05-09 | Stop reason: HOSPADM

## 2020-05-06 RX ORDER — SODIUM CHLORIDE 0.9 % (FLUSH) 0.9 %
3 SYRINGE (ML) INJECTION EVERY 12 HOURS SCHEDULED
Status: DISCONTINUED | OUTPATIENT
Start: 2020-05-06 | End: 2020-05-06 | Stop reason: HOSPADM

## 2020-05-06 RX ORDER — FLUMAZENIL 0.1 MG/ML
0.2 INJECTION INTRAVENOUS AS NEEDED
Status: DISCONTINUED | OUTPATIENT
Start: 2020-05-06 | End: 2020-05-09 | Stop reason: HOSPADM

## 2020-05-06 RX ORDER — FLUTICASONE PROPIONATE 50 MCG
SPRAY, SUSPENSION (ML) NASAL
COMMUNITY
Start: 2020-03-13 | End: 2020-06-23

## 2020-05-06 RX ORDER — CARBOPROST TROMETHAMINE 250 UG/ML
250 INJECTION, SOLUTION INTRAMUSCULAR AS NEEDED
Status: DISCONTINUED | OUTPATIENT
Start: 2020-05-06 | End: 2020-05-06 | Stop reason: HOSPADM

## 2020-05-06 RX ORDER — NALOXONE HCL 0.4 MG/ML
0.2 VIAL (ML) INJECTION AS NEEDED
Status: DISCONTINUED | OUTPATIENT
Start: 2020-05-06 | End: 2020-05-09 | Stop reason: HOSPADM

## 2020-05-06 RX ORDER — MORPHINE SULFATE 1 MG/ML
INJECTION, SOLUTION EPIDURAL; INTRATHECAL; INTRAVENOUS AS NEEDED
Status: DISCONTINUED | OUTPATIENT
Start: 2020-05-06 | End: 2020-05-06 | Stop reason: SURG

## 2020-05-06 RX ORDER — SODIUM CHLORIDE, SODIUM LACTATE, POTASSIUM CHLORIDE, CALCIUM CHLORIDE 600; 310; 30; 20 MG/100ML; MG/100ML; MG/100ML; MG/100ML
125 INJECTION, SOLUTION INTRAVENOUS CONTINUOUS
Status: DISCONTINUED | OUTPATIENT
Start: 2020-05-06 | End: 2020-05-06

## 2020-05-06 RX ORDER — MOMETASONE FUROATE 50 UG/1
2 SPRAY, METERED NASAL DAILY
COMMUNITY
Start: 2020-03-13 | End: 2021-03-13

## 2020-05-06 RX ORDER — OXYTOCIN-SODIUM CHLORIDE 0.9% IV SOLN 30 UNIT/500ML 30-0.9/5 UT/ML-%
999 SOLUTION INTRAVENOUS ONCE
Status: COMPLETED | OUTPATIENT
Start: 2020-05-06 | End: 2020-05-06

## 2020-05-06 RX ORDER — ACETAMINOPHEN 650 MG/1
650 SUPPOSITORY RECTAL ONCE AS NEEDED
Status: COMPLETED | OUTPATIENT
Start: 2020-05-06 | End: 2020-05-08

## 2020-05-06 RX ORDER — PROMETHAZINE HYDROCHLORIDE 25 MG/1
25 SUPPOSITORY RECTAL ONCE AS NEEDED
Status: DISCONTINUED | OUTPATIENT
Start: 2020-05-06 | End: 2020-05-09 | Stop reason: HOSPADM

## 2020-05-06 RX ORDER — GLYCOPYRROLATE 0.2 MG/ML
INJECTION INTRAMUSCULAR; INTRAVENOUS AS NEEDED
Status: DISCONTINUED | OUTPATIENT
Start: 2020-05-06 | End: 2020-05-06 | Stop reason: SURG

## 2020-05-06 RX ORDER — OXYTOCIN-SODIUM CHLORIDE 0.9% IV SOLN 30 UNIT/500ML 30-0.9/5 UT/ML-%
250 SOLUTION INTRAVENOUS CONTINUOUS
Status: DISCONTINUED | OUTPATIENT
Start: 2020-05-06 | End: 2020-05-06

## 2020-05-06 RX ORDER — OXYCODONE AND ACETAMINOPHEN 7.5; 325 MG/1; MG/1
1 TABLET ORAL ONCE AS NEEDED
Status: DISCONTINUED | OUTPATIENT
Start: 2020-05-06 | End: 2020-05-09 | Stop reason: HOSPADM

## 2020-05-06 RX ORDER — DEXTROSE, SODIUM CHLORIDE, SODIUM LACTATE, POTASSIUM CHLORIDE, AND CALCIUM CHLORIDE 5; .6; .31; .03; .02 G/100ML; G/100ML; G/100ML; G/100ML; G/100ML
125 INJECTION, SOLUTION INTRAVENOUS CONTINUOUS
Status: DISCONTINUED | OUTPATIENT
Start: 2020-05-06 | End: 2020-05-09 | Stop reason: HOSPADM

## 2020-05-06 RX ORDER — IBUPROFEN 600 MG/1
600 TABLET ORAL EVERY 8 HOURS PRN
Status: DISCONTINUED | OUTPATIENT
Start: 2020-05-06 | End: 2020-05-09 | Stop reason: HOSPADM

## 2020-05-06 RX ORDER — ONDANSETRON 2 MG/ML
4 INJECTION INTRAMUSCULAR; INTRAVENOUS ONCE
Status: COMPLETED | OUTPATIENT
Start: 2020-05-06 | End: 2020-05-06

## 2020-05-06 RX ADMIN — PHENYLEPHRINE HYDROCHLORIDE 200 MCG: 10 INJECTION INTRAVENOUS at 08:46

## 2020-05-06 RX ADMIN — SODIUM CHLORIDE, POTASSIUM CHLORIDE, SODIUM LACTATE AND CALCIUM CHLORIDE 1000 ML: 600; 310; 30; 20 INJECTION, SOLUTION INTRAVENOUS at 21:49

## 2020-05-06 RX ADMIN — BUPIVACAINE HYDROCHLORIDE 1.8 ML: 7.5 INJECTION, SOLUTION EPIDURAL; RETROBULBAR at 08:45

## 2020-05-06 RX ADMIN — SODIUM CHLORIDE, POTASSIUM CHLORIDE, SODIUM LACTATE AND CALCIUM CHLORIDE 1000 ML: 600; 310; 30; 20 INJECTION, SOLUTION INTRAVENOUS at 06:30

## 2020-05-06 RX ADMIN — SODIUM CHLORIDE, POTASSIUM CHLORIDE, SODIUM LACTATE AND CALCIUM CHLORIDE 125 ML/HR: 600; 310; 30; 20 INJECTION, SOLUTION INTRAVENOUS at 22:55

## 2020-05-06 RX ADMIN — OXYTOCIN 999 ML/HR: 10 INJECTION INTRAVENOUS at 09:06

## 2020-05-06 RX ADMIN — OXYTOCIN 999 ML/HR: 10 INJECTION, SOLUTION INTRAMUSCULAR; INTRAVENOUS at 10:25

## 2020-05-06 RX ADMIN — ACETAMINOPHEN 1000 MG: 500 TABLET, FILM COATED ORAL at 08:13

## 2020-05-06 RX ADMIN — ONDANSETRON 4 MG: 2 INJECTION INTRAMUSCULAR; INTRAVENOUS at 08:14

## 2020-05-06 RX ADMIN — GLYCOPYRROLATE 0.2 MG: 0.2 INJECTION INTRAMUSCULAR; INTRAVENOUS at 08:52

## 2020-05-06 RX ADMIN — ONDANSETRON 4 MG: 2 INJECTION INTRAMUSCULAR; INTRAVENOUS at 13:53

## 2020-05-06 RX ADMIN — CEFAZOLIN SODIUM 2 G: 2 INJECTION, SOLUTION INTRAVENOUS at 08:23

## 2020-05-06 RX ADMIN — MORPHINE SULFATE 0.2 MG: 1 INJECTION, SOLUTION EPIDURAL; INTRATHECAL; INTRAVENOUS at 08:45

## 2020-05-06 RX ADMIN — SODIUM CHLORIDE, POTASSIUM CHLORIDE, SODIUM LACTATE AND CALCIUM CHLORIDE 125 ML/HR: 600; 310; 30; 20 INJECTION, SOLUTION INTRAVENOUS at 13:33

## 2020-05-06 RX ADMIN — EPHEDRINE SULFATE 10 MG: 50 INJECTION INTRAVENOUS at 09:14

## 2020-05-06 RX ADMIN — HUMAN RHO(D) IMMUNE GLOBULIN 1500 UNITS: 1500 SOLUTION INTRAMUSCULAR; INTRAVENOUS at 18:37

## 2020-05-06 RX ADMIN — SODIUM CHLORIDE, POTASSIUM CHLORIDE, SODIUM LACTATE AND CALCIUM CHLORIDE 125 ML/HR: 600; 310; 30; 20 INJECTION, SOLUTION INTRAVENOUS at 07:30

## 2020-05-06 RX ADMIN — MISOPROSTOL 600 MCG: 200 TABLET ORAL at 10:31

## 2020-05-06 RX ADMIN — PHENYLEPHRINE HYDROCHLORIDE 200 MCG: 10 INJECTION INTRAVENOUS at 08:50

## 2020-05-06 RX ADMIN — GLYCOPYRROLATE 0.2 MG: 0.2 INJECTION INTRAMUSCULAR; INTRAVENOUS at 09:14

## 2020-05-06 RX ADMIN — SODIUM CHLORIDE, SODIUM LACTATE, POTASSIUM CHLORIDE, CALCIUM CHLORIDE AND DEXTROSE MONOHYDRATE 125 ML/HR: 5; 600; 310; 30; 20 INJECTION, SOLUTION INTRAVENOUS at 17:35

## 2020-05-06 RX ADMIN — KETOROLAC TROMETHAMINE 30 MG: 30 INJECTION, SOLUTION INTRAMUSCULAR; INTRAVENOUS at 09:26

## 2020-05-06 RX ADMIN — OXYTOCIN 125 ML/HR: 10 INJECTION, SOLUTION INTRAMUSCULAR; INTRAVENOUS at 11:24

## 2020-05-06 RX ADMIN — FAMOTIDINE 20 MG: 10 INJECTION, SOLUTION INTRAVENOUS at 08:16

## 2020-05-06 RX ADMIN — SODIUM CHLORIDE, POTASSIUM CHLORIDE, SODIUM LACTATE AND CALCIUM CHLORIDE 999 ML/HR: 600; 310; 30; 20 INJECTION, SOLUTION INTRAVENOUS at 10:46

## 2020-05-06 NOTE — LACTATION NOTE
Baby Homero (A) did not nurse and took only 5 cc formula. Baby Dennys nursed x 5 minutes and took 18cc formula. Patient wants to pump to bring in milk supply. She is 40, is hypothyroid and has breast implants. Script given for personal pump.

## 2020-05-06 NOTE — ANESTHESIA PROCEDURE NOTES
Intrathecal Block      Patient reassessed immediately prior to procedure    Performed By  Anesthesiologist: Danilo Boss MD  Preanesthetic Checklist  Completed: patient identified, site marked, surgical consent, pre-op evaluation, timeout performed, IV checked, risks and benefits discussed and monitors and equipment checked  Intrathecal Block Prep:  Pt Position:sitting  Sterile Tech:sterile barrier, gloves and cap  Prep:chloraprep  Monitoring:blood pressure monitoring, continuous pulse oximetry and EKG  Intrathecal Block Procedure:  Approach:midline  Guidance:palpation technique  Location:L3-L4  Needle Type:Zuri  Needle Gauge:25 G  Placement of Needle Event: cerebrospinal fluid  Fluid Appearance:clear  Post Assessment:  Patient Tolerance:patient tolerated the procedure well with no apparent complications  Complications:no

## 2020-05-06 NOTE — ADDENDUM NOTE
Addendum  created 05/06/20 1505 by Danilo Boss MD    Visit Navigator Flowsheet section accepted      
spouse

## 2020-05-06 NOTE — LACTATION NOTE
Lactation Consult Note    Evaluation Completed: 2020 17:27  Patient Name: Stephanie Rodriguez  :  1980  MRN:  [id number]     REFERRAL  INFORMATION:                          Date of Referral: 20   Person Making Referral: nurse  Maternal Reason for Referral: breast surgery/injury history, breastfeeding currently, maternal age advanced, multiple births, other (see comments)(hypothyroid)  Infant Reason for Referral: 35-37 weeks gestation    DELIVERY HISTORY:     Delgadillo Rodriguez, YamilasBoy A [[id number]]         Delgadillo Rodriguez, YamilasBoy B [[id number]]           Delgadillo Rodriguez, YamilasBoy A [[id number]]         Delgadillo Rodriguez, YamilasBoy B [[id number]]        Skin to skin initiation date/time:      Delgadillo Rodriguez, YamilasBoy A [[id number]]         Delgadillo Rodriguez, YamilasBoy B [[id number]]            Delgadillo Rodriguez, YamilasBoy A [[id number]]         Delgadillo Rodriguez, YamilasBoy B [[id number]]        Skin to skin end date/time:      Delgadillo Rodriguez, YamilasBoy A [[id number]]         Delgadillo Rodriguez, YamilasBoy B [[id number]]            Delgadillo Rodriguez, YamilasBoy A [[id number]]         Delgadillo Rodriguez, YamilasBoy B [[id number]]           Delgadillo Rodriguez, YamilasBoy A [[id number]]         Delgadillo Rodriguez, YamilasBoy B [[id number]]          MATERNAL ASSESSMENT:  Breast Size Issue: none (20 : Deborah Soriano RN)  Breast Shape: round (20 : Deborah Soriano RN)  Breast Density: soft (20 : Deborah Soriano RN)  Areola: elastic (20 : Deborah Soriano RN)  Nipples: everted (20 : Deborah Soriano RN)                INFANT ASSESSMENT:  Information for the patient's :  Bela Fong [[id number]]   No past medical history on file.  Information for the patient's :  Bela Fong [[id number]]   No past medical history on file.       Elie Rodriguez  YamilasBoy A [[id number]]         Delgadillo Rodriguez, YamilasBoy B [[id number]]           Delgadillo Rodriguez, YamilasBoy A [[id number]]         Delgadillo Rodriguez, YamilasBoy B [[id number]]           Delgadillo Rodriguez, YamilasBoy A [[id number]]         Delgadillo Rodriguez, YamilasBoy B [[id number]]           Delgadillo Rodriguez, YamilasBoy A [[id number]]         Delgadillo Rodriguez, YamilasBoy B [[id number]]           Delgadillo Rodriguez, YamilasBoy A [[id number]]         Delgadillo Rodriguez, YamilasBoy B [[id number]]           Delgadillo Rodriguez, YamilasBoy A [[id number]]         Delgadillo Rodriguez, YamilasBoy B [[id number]]           Delgadillo Rodriguez, YamilasBoy A [[id number]]         Delgadillo Rodirguez, YamilasBoy B [[id number]]           Delgadillo Rodriguez, YamilasBoy A [[id number]]         Delgadillo Rodriguez, YamilasBoy B [[id number]]           Delgadillo Rodriguez, YamilasBoy A [[id number]]         Delgadillo Rodriguez, YamilasBoy B [[id number]]           Delgadillo Rodriguez, YamilasBoy A [[id number]]         Delgadillo Rodriguez, YamilasBoy B [[id number]]           Delgadillo Rodriguez, YamilasBoy A [[id number]]         Delgadillo Rodriguez, YamilasBoy B [[id number]]           Delgadillo Rodriguez, YamilasBoy A [[id number]]         Delgadillo Rodriguez, YamilasBoy B [[id number]]           Delgadillo Rodriguez, YamilasBoy A [[id number]]         Delgadillo Rodriguez, YamilasBoy B [[id number]]           Delgadillo Rodriguez, YamilasBoy A [[id number]]         Delgadillo Rodriguez, StephaniesBoy B [[id number]]           Delgadillo Rodriguez, StephaniesBoy A [[id number]]         Delgadillo Rodriguez, StephaniesBoy B [[id number]]           Delgadillo Rodriguez, StephaniesBoy A [[id number]]         Delgadillo Rodriguez, StephaniesBoy B [[id number]]           Delgadillo Rodriguez, StephaniesBoy A [[id number]]         Delgadillo Rodriguez, StephaniesBoy B [[id number]]           Delgadillo Rodriguez, StephaniesBoy A [[id number]]         Delgadillo Rodriguez, StephaniesBodaniela B [[id number]]           Delgadillo Rodriguez,  YamilasBoy A [[id number]]         Delgadillo Rodriguez, YamilasBoy B [[id number]]               Delgadillo Rodriguez, YamilasBoy A [[id number]]         Delgadillo Rodriguez, YamilasBoy B [[id number]]           Delgadillo Rodriguez, YamilasBoy A [[id number]]         Delgadillo Rodriguez, YamilasBoy B [[id number]]           Delgadillo Rodriguez, YamilasBoy A [[id number]]         Delgadillo Rodriguez, YamilasBoy B [[id number]]           Delgadillo Rodriguez, YamilasBoy A [[id number]]         Delgadillo Rodriguez, YamilasBoy B [[id number]]           Delgadillo Rodriguez, YamilasBoy A [[id number]]         Delgadillo Rodriguez, YamilasBoy B [[id number]]           Delgadillo Rodriguez, YamilasBoy A [[id number]]         Delgadillo Rodriguez, YamilasBoy B [[id number]]             Delgadillo Rodriguez, YamilasBoy A [[id number]]         Delgadillo Rodriguez, YamilasBoy B [[id number]]           Delgadillo Rodriguez, YamilasBoy A [[id number]]         Delgadillo Rodriguez, YamilasBoy B [[id number]]           Delgadillo Rodriguez, YamilasBoy A [[id number]]         Delgadillo Rodriguez, YamilasBoy B [[id number]]              MATERNAL INFANT FEEDING:  Maternal Preparation: breast care, hand hygiene (05/06/20 1700 : Deborah Soriano RN)  Maternal Emotional State: assist needed (05/06/20 1700 : Deborah Soriano, RN)                     Milk Ejection Reflex: other (see comments)(few drops on left) (05/06/20 1700 : Deborah Soriano, RN)                                           EQUIPMENT TYPE:  Breast Pump Type: double electric, hospital grade (05/06/20 1700 : Deborah Soriano, RN)  Breast Pump Flange Type: hard (05/06/20 1700 : Deborah Soriano RN)  Breast Pump Flange Size: 24 mm, 27 mm (05/06/20 1700 : Deborah Soriano RN)                        BREAST PUMPING:  Breast Pumping Interventions: other (see comments)(insurance pumping 3-4 x day) (05/06/20 1700 : Deborah Soriano RN)       LACTATION REFERRALS:

## 2020-05-06 NOTE — PROGRESS NOTES
Called to see the patient regarding postoperative bleeding.  The patient is status post  delivery for twins.  Quantitative estimate of blood loss was 194 9 cc.  The patient had an episode of bleeding with the passage of clots.  She was evaluated by her nurse in the recovery room.  The uterus was not firm and was above the umbilicus.  There was passage of blood and clots.  The patient was treated with fundal massage as well as Cytotec.      On my arrival, the patient was awake and alert.  She was comfortable.  Pulse was in the 70s.  On my examination, the uterus was firm and below the umbilicus.  On vaginal examination, there was only a small amount of lochia.  No clots were present.  Urine output was decreased at 60 cc for approximately 3 hours.  Hemoglobin had decreased from 11.3-8.0.      I counseled the patient regarding my findings.  It appears that the patient had a postpartum hemorrhage due to uterine atony.  This appears to have responded to fundal massage and treatment with Cytotec.  Currently, the uterus is firm and below the umbilicus.  Lochia is minimal.  The patient appears to be stable.  I explained these findings to the patient and answered her questions.  I recommend a repeat hemoglobin along with platelet count and coagulation studies 2 hours from the last set of labs.  Also, continue to observe the patient on labor and delivery recovery room.  Urine output and bleeding will also be monitored.  I answered the patient's questions and she agrees with these recommendations.  I also discussed the case with Dr. Baum.  He agrees with these recommendations as well.

## 2020-05-06 NOTE — PROGRESS NOTES
Case was discussed with the patient's nurse.  Hemoglobin has stayed stable.  Platelet count and coagulation studies were normal.  Vital signs are stable.  Lochia is minimal.  At this point, I feel that the patient is stable for transfer to the post partum floor.

## 2020-05-06 NOTE — ANESTHESIA POSTPROCEDURE EVALUATION
Patient: Stephanie Rodriguez    Procedure Summary     Date:  20 Room / Location:   PATRICE LABOR DELIVERY   PATRICE LABOR DELIVERY    Anesthesia Start:  844 Anesthesia Stop:  944    Procedure:   SECTION PRIMARY (N/A Abdomen) Diagnosis:  (breech twins and gest hypertension)    Surgeon:  Chad Baum MD Provider:  Danilo Boss MD    Anesthesia Type:  spinal ASA Status:  3          Anesthesia Type: spinal    Vitals  Vitals Value Taken Time   /76 2020  1:45 PM   Temp 36.4 °C (97.6 °F) 2020  9:50 AM   Pulse 79 2020  1:53 PM   Resp 16 2020  1:55 PM   SpO2 96 % 2020  1:53 PM   Vitals shown include unvalidated device data.        Post Anesthesia Care and Evaluation    Patient location during evaluation: PACU  Anesthetic complications: No anesthetic complications    Comments: Pt evaluated and treated for post op bleeding now stable to floor

## 2020-05-06 NOTE — INTERVAL H&P NOTE
H&P reviewed. The patient was examined and there are no changes to the H&P. US  SHOWED BREECH TWIN THIS MORNING. keyonna

## 2020-05-06 NOTE — ANESTHESIA PREPROCEDURE EVALUATION
Anesthesia Evaluation     Patient summary reviewed and Nursing notes reviewed   no history of anesthetic complications:  NPO Solid Status: > 6 hours             Airway   Mallampati: III  TM distance: >3 FB  Neck ROM: full  No difficulty expected  Dental      Pulmonary - normal exam   (-) not a smoker  Cardiovascular - normal exam    (+) hypertension,   (-) angina      Neuro/Psych  GI/Hepatic/Renal/Endo    (+)   thyroid problem hypothyroidism    Musculoskeletal     Abdominal    Substance History      OB/GYN    (+) Pregnant (36W6D IVF TWINS ), pregnancy induced hypertension        Other        (-) blood dyscrasia                  Anesthesia Plan    ASA 3     spinal       Anesthetic plan, all risks, benefits, and alternatives have been provided, discussed and informed consent has been obtained with: patient.

## 2020-05-06 NOTE — OP NOTE
" Section Procedure Note    Indications: 36 6/7 weeks with Vtx/Breech Sonal Twins, AMA, Chronic hypertension with superimposed PIH, In Vitro pregnancy , Hypothyroidism    Pre-operative Diagnosis: 36 week 6 day pregnancy.  Vtx/Breech Twins. Chronic hypertension with superimposed PIH. AMA. In Vitro pregnancy, Hypothyroidism   PROCEDURE\"  Primary LTCS   Post-operative Diagnosis: same    Surgeon: Chad Baum MD     Assistants: Ken    Anesthesia: Spinal anesthesia    ASA Class: 2    Procedure Details   The patient was seen in the Holding Room. The risks, benefits, complications, treatment options, and expected outcomes were discussed with the patient.  The patient concurred with the proposed plan, giving informed consent.  The site of surgery properly noted/marked. The patient was taken to Operating Room , identified as Stephanie Rodriguez and the procedure verified as  Delivery. A Time Out was held and the above information confirmed.    After induction of anesthesia, the patient was draped and prepped in the usual sterile manner. A Pfannenstiel incision was made and carried down through the subcutaneous tissue to the fascia. Fascial incision was made and extended transversely. The fascia was  from the underlying rectus tissue superiorly and inferiorly. The peritoneum was identified and entered. Peritoneal incision was extended longitudinally. The utero-vesical peritoneal reflection was incised transversely and the bladder flap was bluntly freed from the lower uterine segment. A low transverse uterine incision was made. Delivered from Vtx  presentation was a 6 lb 8 oz infant male Twin A  with Apgar scores of 8 at one minute and 9 at five minutes. Cord clamp placed ,on Twin A cord.  Twin B was then delivered in the jae breech presentation weighing 6 pounds even with Apgars of 8 and 8.  After the umbilical cord was clamped and cut cord blood was obtained for evaluation. The placenta " was removed intact and appeared normal with 3 vessel cord. The uterine outline,  and ovaries appeared normal.  The tubes were very atrophic especially at their insertion into the uterus with filmy adhesions which were taken down with the Bovie.  The uterine incision was closed with running locked sutures of 0 chromic. Hemostasis was observed. Lavage was carried out until clear.  The muscles were approximated in the midline with interrupted 0 chromic sutures.  The fascia was then reapproximated with running sutures of 0 Vicryl. The skin was closed with subcuticular 4-0 Vicryl.  Pt transferred to recovery room in satisfactory condition. Pt received IV Kefzol preop.  Blood type O   RH Neg TR Immune.  GBS Neg       Instrument, sponge, and needle counts were correct prior to the abdominal closure and at the conclusion of the case.     Findings:  Live viable male infants weighing Twin A VTX  6 lbs  8 oz with apgars 8/9, Twin B Breech 6-0 8/8.     Estimated Blood Loss: 1000cc    Specimens: * No orders in the log *                   Complications: None; patient tolerated the procedure well.                    Condition: Stable    Attending Attestation: Chad Baum MD

## 2020-05-06 NOTE — PLAN OF CARE
Problem: Patient Care Overview  Goal: Plan of Care Review  Outcome: Ongoing (interventions implemented as appropriate)  Flowsheets  Taken 5/6/2020 1208  Progress: improving  Outcome Summary: Pt admitted for scheduled c/s for twins at 36 weeks and 6 days r/t chronic HTN. Successful c/s delivery at 0904 and 0906. QBL during surgery was 1557. Initiated hemorrhage protocol, bleeding moderate to heavy with clots present during recovery. 600 of cytotec given rectally. H&H redrawn and results show decreased numbers. MD notified and pt assessed 2 hours postop. Bleeding has decreased to light/scant at this time. Will continue to monitor bleeding in L&D until 1300 and redraw labs at 1300.  Taken 5/6/2020 0720  Plan of Care Reviewed With: spouse;patient     Martina Ragland RN

## 2020-05-07 PROBLEM — Z98.891 S/P CESAREAN SECTION: Status: ACTIVE | Noted: 2020-05-07

## 2020-05-07 LAB
BASOPHILS # BLD AUTO: 0.03 10*3/MM3 (ref 0–0.2)
BASOPHILS NFR BLD AUTO: 0.4 % (ref 0–1.5)
DEPRECATED RDW RBC AUTO: 47.1 FL (ref 37–54)
EOSINOPHIL # BLD AUTO: 0.14 10*3/MM3 (ref 0–0.4)
EOSINOPHIL NFR BLD AUTO: 1.8 % (ref 0.3–6.2)
ERYTHROCYTE [DISTWIDTH] IN BLOOD BY AUTOMATED COUNT: 13.6 % (ref 12.3–15.4)
HCT VFR BLD AUTO: 21.3 % (ref 34–46.6)
HGB BLD-MCNC: 7.2 G/DL (ref 12–15.9)
IMM GRANULOCYTES # BLD AUTO: 0.05 10*3/MM3 (ref 0–0.05)
IMM GRANULOCYTES NFR BLD AUTO: 0.6 % (ref 0–0.5)
LYMPHOCYTES # BLD AUTO: 0.96 10*3/MM3 (ref 0.7–3.1)
LYMPHOCYTES NFR BLD AUTO: 12.4 % (ref 19.6–45.3)
MCH RBC QN AUTO: 31.7 PG (ref 26.6–33)
MCHC RBC AUTO-ENTMCNC: 33.8 G/DL (ref 31.5–35.7)
MCV RBC AUTO: 93.8 FL (ref 79–97)
MONOCYTES # BLD AUTO: 0.66 10*3/MM3 (ref 0.1–0.9)
MONOCYTES NFR BLD AUTO: 8.5 % (ref 5–12)
NEUTROPHILS # BLD AUTO: 5.93 10*3/MM3 (ref 1.7–7)
NEUTROPHILS NFR BLD AUTO: 76.3 % (ref 42.7–76)
NRBC BLD AUTO-RTO: 0 /100 WBC (ref 0–0.2)
PLATELET # BLD AUTO: 153 10*3/MM3 (ref 140–450)
PMV BLD AUTO: 9.8 FL (ref 6–12)
RBC # BLD AUTO: 2.27 10*6/MM3 (ref 3.77–5.28)
REF LAB TEST METHOD: NORMAL
SARS-COV-2 RNA RESP QL NAA+PROBE: NOT DETECTED
SARS-COV-2 RNA RESP QL NAA+PROBE: NOT DETECTED
WBC NRBC COR # BLD: 7.77 10*3/MM3 (ref 3.4–10.8)

## 2020-05-07 PROCEDURE — 99024 POSTOP FOLLOW-UP VISIT: CPT | Performed by: OBSTETRICS & GYNECOLOGY

## 2020-05-07 PROCEDURE — 85025 COMPLETE CBC W/AUTO DIFF WBC: CPT | Performed by: OBSTETRICS & GYNECOLOGY

## 2020-05-07 RX ADMIN — DOCUSATE SODIUM 100 MG: 100 CAPSULE, LIQUID FILLED ORAL at 08:16

## 2020-05-07 RX ADMIN — NIFEDIPINE 60 MG: 60 TABLET, FILM COATED, EXTENDED RELEASE ORAL at 08:16

## 2020-05-07 RX ADMIN — SIMETHICONE CHEW TAB 80 MG 80 MG: 80 TABLET ORAL at 23:09

## 2020-05-07 RX ADMIN — LEVOTHYROXINE SODIUM 125 MCG: 125 TABLET ORAL at 06:31

## 2020-05-07 RX ADMIN — SIMETHICONE CHEW TAB 80 MG 80 MG: 80 TABLET ORAL at 14:53

## 2020-05-07 RX ADMIN — IBUPROFEN 600 MG: 600 TABLET, FILM COATED ORAL at 17:27

## 2020-05-07 RX ADMIN — IBUPROFEN 600 MG: 600 TABLET, FILM COATED ORAL at 09:02

## 2020-05-07 RX ADMIN — SIMETHICONE CHEW TAB 80 MG 80 MG: 80 TABLET ORAL at 06:32

## 2020-05-07 RX ADMIN — DOCUSATE SODIUM 100 MG: 100 CAPSULE, LIQUID FILLED ORAL at 23:09

## 2020-05-07 NOTE — PROGRESS NOTES
[facility]   PROGRESS NOTE    Post-Op Day 1 S/P   Subjective   Subjective  Patient reports:  Pain is well controlled with prescription NSAID's including ibuprofen (Motrin) and narcotic analgesics including oxycodone/acetaminophen (Percocet, Tylox).  She is  ambulating. Tolerating diet. Tolerating po -- normal.  Intake -- c/o of tolerating po solids and tolerating po liquids.   Voiding - without difficulty; no flatus reported..  Vaginal bleeding is light.    Objective    Objective     Vitals: Vital Signs Range for the last 24 hours  Temperature: Temp:  [97.2 °F (36.2 °C)-98.8 °F (37.1 °C)] 98.4 °F (36.9 °C)   Temp Source: Temp src: Oral   BP: BP: (100-145)/(61-86) 131/83   Pulse: Heart Rate:  [51-95] 75   Respirations: Resp:  [16-18] 18   SPO2: SpO2:  [87 %-100 %] 98 %   O2 Amount (l/min): Flow (L/min):  [2] 2   O2 Devices Device (Oxygen Therapy): room air   Weight:              Physical Exam    Lungs clear to auscultation bilaterally   Abdomen Soft, non-tender, normal bowel sounds; no bruits, organomegaly or masses.   Incision  Bandage clean, dry   Extremities extremities normal, atraumatic, no cyanosis or edema     I reviewed the patient's new clinical results.  Hgb 7.2 - asymptomatic.  Will add Fe with GI function    Assessment/Plan        Breech presentation, antepartum, fetus 2    Assessment & Plan    Assessment:    Stephanie Rodriguez is Day 1  post-partum       Bela Fong [[id number]]   , Low Transverse     Bela Fong B [[id number]]   , Low Transverse        Bela Fong [[id number]]         Bela Fong [[id number]]      .      Plan:  remove dressing, continue post op care, Rh negative: needs Rh Immunoglobulin and desires circ.  Will hold until tomorrow.      Pasquale Grady MD  20  12:45

## 2020-05-07 NOTE — PLAN OF CARE
Decreased urine output. 1L bolus given. IVF continue. FC in place. Has been up to bathroom x1. Doing well. VSS. Using EBP and breastfeeding infants. Will continue to monitor.   Problem: Patient Care Overview  Goal: Plan of Care Review  Outcome: Ongoing (interventions implemented as appropriate)  Goal: Individualization and Mutuality  Outcome: Ongoing (interventions implemented as appropriate)  Goal: Discharge Needs Assessment  Outcome: Ongoing (interventions implemented as appropriate)

## 2020-05-07 NOTE — LACTATION NOTE
P1. Patient is a 40 year old primip . She is hypothyroid and has breast implants . Her nipples are well everted and twin B will latch but not stay at the breast very long. Twin A is having temperature issues. She has the HGP at bedside and has pumped x 1. Script for personal pump given.

## 2020-05-07 NOTE — LACTATION NOTE
Gave mom personal pump  Lactation Consult Note    Evaluation Completed: 2020 17:28  Patient Name: Stephanie Rodriguez  :  1980  MRN:  [id number]     REFERRAL  INFORMATION:                          Date of Referral: 20   Person Making Referral: nurse  Maternal Reason for Referral: breast surgery/injury history, breastfeeding currently, maternal age advanced, multiple births, other (see comments)(hypothyroid)  Infant Reason for Referral: 35-37 weeks gestation    DELIVERY HISTORY:     Delgadillojerome Rodriguez, YamilasBoy A [[id number]]         Delgadillo Rodriguez, YamilasBoy B [[id number]]           Delgadillo Rodriguez, YamilasBoy A [[id number]]         Delgadillo Rodriguez, YamilasBoy B [[id number]]        Skin to skin initiation date/time:      Delgadillo Rodriguez, YamilasBoy A [[id number]]         Delgadillo Rodriguez, YamilasBoy B [[id number]]            Delgadillo Rodriguez, YamilasBoy A [[id number]]         Delgadillo Rodriguez, YamilasBoy B [[id number]]        Skin to skin end date/time:      Delgadillo Rodriguez, YamilasBoy A [[id number]]         Delgadillo Rodriguez, YamilasBoy B [[id number]]            Delgadillo Rodriguez, YamilasBoy A [[id number]]         Delgadillo Rodriguez, YamilasBoy B [[id number]]           Delgadillo Rodriguez, YamilasBoy A [[id number]]         Delgadillo Rodriguez, YamilasBoy B [[id number]]          MATERNAL ASSESSMENT:                               INFANT ASSESSMENT:  Information for the patient's :  Stephanie FongsBoy A [[id number]]   No past medical history on file.  Information for the patient's :  Delgadillo Rodriguez, YamilasBoy B [[id number]]   No past medical history on file.       Delgadillojerome Mimsnos, YamilasBoy A [[id number]]         Delgadillo Rodriguez, YamilasBoy B [[id number]]           Delgadillo Rodriguez, YamilasBoy A [[id number]]         Bela Fong [[id number]]           Bela Fong [[id number]]         Elie Rodriguez,  YamilasBoy B [[id number]]           Delgadillo Rodriguez, YamilasBoy A [[id number]]         Delgadillo Rodriguez, YamilasBoy B [[id number]]           Delgadillo Rodriguez, YamilasBoy A [[id number]]         Delgadillo Rodriguez, YamilasBoy B [[id number]]           Delgadillo Rodriguez, YamilasBoy A [[id number]]         Delgadillo Rodriguez, YamilasBoy B [[id number]]           Delgadillo Rodriguez, YamilasBoy A [[id number]]         Delgadillo Rodriguez, YamilasBoy B [[id number]]           Delgadillo Rodriguez, YamilasBoy A [[id number]]         Delgadillo Rodriguez, YamilasBoy B [[id number]]           Delgadillo Rodriguez, YamilasBoy A [[id number]]         Delgadillo Rodriguez, YamilasBoy B [[id number]]           Delgadillo Rodriguez, YamilasBoy A [[id number]]         Delgadillo Rodriguez, YamilasBoy B [[id number]]           Delgadillo Rodriguez, YamilasBoy A [[id number]]         Delgadillo Rodriguez, YamilasBoy B [[id number]]           Delgadillo Rodriguez, YamilasBoy A [[id number]]         Delgadillo Rodriguez, YamilasBoy B [[id number]]           Delgadillo Rodriguez, YamilasBoy A [[id number]]         Delgadillo Rodriguez, YamilasBoy B [[id number]]           Delgadillo Rodriguez, YamilasBoy A [[id number]]         Delgadillo Rodriguez, YamilasBoy B [[id number]]           Delgadillo Rodriguez, YamilasBoy A [[id number]]         Delgadillo Rodriguez, YamilasBoy B [[id number]]           Delgadillo Rodriguez, YamilasBoy A [[id number]]         Delgadillo Rodriguez, YamilasBoy B [[id number]]           Delgadillo Rodriguez, LynetteilasBoy A [[id number]]         Delgadillo Rodriguez, YamilasBoy B [[id number]]           Delgadillo Rodriguez, StephaniesBoy A [[id number]]         Delgadillo Rodriguez, YamilasBoy B [[id number]]           Delgadillo Rodriguez, StephaniesBoy A [[id number]]         Delgadillo Rodriguez, StephaniesBoy B [[id number]]               Delgadillo Rodriguez, StephaniesBoy A [[id number]]         Delgadillo Rodriguez, StephaniesBoy B [[id number]]           Delgadillo Rodriguez, StephaniesBoy A [[id number]]         Delgadillo Rodriguez,  YamilasBoy B [[id number]]           Delgadillo Rodriguez, YamilasBoy A [[id number]]         Delgadillo Rodriguez, YamilasBoy B [[id number]]           Delgadillo Rodriguez, YamilasBoy A [[id number]]         Delgadillo Rodriguez, YamilasBoy B [[id number]]           Delgadillo Rodriguez, YamilasBoy A [[id number]]         Delgadillo Rodriguez, YamilasBoy B [[id number]]           Delgadillo Rodriguez, YamilasBoy A [[id number]]         Delgadillo Rodriguez, YamilasBoy B [[id number]]             Delgadillo Rodriguez, YamilasBoy A [[id number]]         Delgadillo Rodriguez, YamilasBoy B [[id number]]           Delgadillo Rodriguez, YamilasBoy A [[id number]]         Delgadillo Rodriguez, YamilasBoy B [[id number]]           Delgadillo Rodriguez, YamilasBoy A [[id number]]         Delgadillo Rodriguez, YamilasBoy B [[id number]]              MATERNAL INFANT FEEDING:                                                                       EQUIPMENT TYPE:                                 BREAST PUMPING:          LACTATION REFERRALS:

## 2020-05-08 PROCEDURE — 99024 POSTOP FOLLOW-UP VISIT: CPT | Performed by: OBSTETRICS & GYNECOLOGY

## 2020-05-08 RX ORDER — ACETAMINOPHEN 325 MG/1
650 TABLET ORAL EVERY 4 HOURS PRN
Status: DISCONTINUED | OUTPATIENT
Start: 2020-05-08 | End: 2020-05-09 | Stop reason: HOSPADM

## 2020-05-08 RX ADMIN — IBUPROFEN 600 MG: 600 TABLET, FILM COATED ORAL at 09:10

## 2020-05-08 RX ADMIN — LEVOTHYROXINE SODIUM 125 MCG: 125 TABLET ORAL at 05:54

## 2020-05-08 RX ADMIN — DOCUSATE SODIUM 100 MG: 100 CAPSULE, LIQUID FILLED ORAL at 09:34

## 2020-05-08 RX ADMIN — Medication: at 09:10

## 2020-05-08 RX ADMIN — IBUPROFEN 600 MG: 600 TABLET, FILM COATED ORAL at 20:26

## 2020-05-08 RX ADMIN — ACETAMINOPHEN 650 MG: 325 TABLET, FILM COATED ORAL at 16:33

## 2020-05-08 RX ADMIN — NIFEDIPINE 60 MG: 60 TABLET, FILM COATED, EXTENDED RELEASE ORAL at 09:10

## 2020-05-08 NOTE — PROGRESS NOTES
[facility]   PROGRESS NOTE    Post-Op Day 2 S/P   Subjective   Subjective  Patient reports:  Pain is well controlled with prescription NSAID's including ibuprofen (Motrin) and narcotic analgesics including oxycodone/acetaminophen (Percocet, Tylox).  She is  ambulating. Tolerating diet. Tolerating po -- normal.  Intake -- c/o of tolerating po solids and tolerating po liquids.   Voiding - without difficulty; minimal flatus reported..  Vaginal bleeding is light.    Objective    Objective     Vitals: Vital Signs Range for the last 24 hours  Temperature: Temp:  [97.8 °F (36.6 °C)-98.3 °F (36.8 °C)] 97.8 °F (36.6 °C)   Temp Source: Temp src: Oral   BP: BP: (113-144)/(66-80) 128/78   Pulse: Heart Rate:  [69-80] 69   Respirations: Resp:  [16-18] 16   SPO2:     O2 Amount (l/min):     O2 Devices Device (Oxygen Therapy): room air   Weight:              Physical Exam    Lungs clear to auscultation bilaterally   Abdomen Soft, nonteder, mild distention.  BS+   Incision  healing well, no drainage, no erythema, no seroma, well approximated   Extremities extremities normal, atraumatic, no cyanosis or edema     I reviewed the patient's new clinical results.    Assessment/Plan        S/P  section    Breech presentation, antepartum, fetus 2    Assessment & Plan    Assessment:    Stephanie Elie Rodriguez is Day 2  post-partum       Bela Fong [[id number]]   , Low Transverse     Bela Fong B [[id number]]   , Low Transverse        Bela Fong [[id number]]         Bela Fong [[id number]]      .      Plan:  continue post op care, plan for discharge tomorrow and desires circ of twins..      Pasquale Grady MD  20  12:27

## 2020-05-08 NOTE — LACTATION NOTE
"Patient states babies are offered breast first then given formula but she has no milk. She has the HGP at bedside but said she has been 'too busy\" to pump. She wants to shower now . Enc her to call  later for help with tandem nursing.  "

## 2020-05-09 VITALS
SYSTOLIC BLOOD PRESSURE: 133 MMHG | TEMPERATURE: 98.5 F | DIASTOLIC BLOOD PRESSURE: 82 MMHG | HEIGHT: 64 IN | OXYGEN SATURATION: 98 % | BODY MASS INDEX: 35 KG/M2 | WEIGHT: 205 LBS | HEART RATE: 77 BPM | RESPIRATION RATE: 16 BRPM

## 2020-05-09 RX ORDER — FERROUS SULFATE 325(65) MG
325 TABLET ORAL
Status: DISCONTINUED | OUTPATIENT
Start: 2020-05-09 | End: 2020-05-09 | Stop reason: HOSPADM

## 2020-05-09 RX ORDER — IBUPROFEN 600 MG/1
600 TABLET ORAL EVERY 8 HOURS PRN
Qty: 50 TABLET | Refills: 3 | Status: SHIPPED | OUTPATIENT
Start: 2020-05-09 | End: 2020-05-27 | Stop reason: SDUPTHER

## 2020-05-09 RX ORDER — OXYCODONE HYDROCHLORIDE AND ACETAMINOPHEN 5; 325 MG/1; MG/1
2 TABLET ORAL EVERY 6 HOURS PRN
Qty: 30 TABLET | Refills: 0 | Status: ON HOLD | OUTPATIENT
Start: 2020-05-09 | End: 2020-05-19 | Stop reason: SDUPTHER

## 2020-05-09 RX ORDER — FERROUS SULFATE 325(65) MG
325 TABLET ORAL
Qty: 30 TABLET | Refills: 2 | Status: SHIPPED | OUTPATIENT
Start: 2020-05-09

## 2020-05-09 RX ADMIN — NIFEDIPINE 60 MG: 60 TABLET, FILM COATED, EXTENDED RELEASE ORAL at 08:13

## 2020-05-09 RX ADMIN — LEVOTHYROXINE SODIUM 125 MCG: 125 TABLET ORAL at 05:46

## 2020-05-09 RX ADMIN — DOCUSATE SODIUM 100 MG: 100 CAPSULE, LIQUID FILLED ORAL at 08:13

## 2020-05-09 NOTE — DISCHARGE INSTRUCTIONS
Pelvic rest for 6 weeks  No heavy lifting for 6 weeks   No tub baths for 2 weeks   No driving for 2 weeks

## 2020-05-09 NOTE — LACTATION NOTE
"This note was copied from a baby's chart.  Mother reports that she is still pumping sometimes but \"no milk yet\"-has been giving formula. Discussed pumping routinely to help bring milk in, when mature milk expected to come in, expected output and wt gain. Gave [employer]/zoom info. Mother denies needing any assistance or having any questions at this time. Advised to call for needs.   "

## 2020-05-09 NOTE — DISCHARGE SUMMARY
Date of Discharge:  2020    Discharge Diagnosis: postpartum care immediately after delivery     Presenting Problem/History of Present Illness  Breech presentation, antepartum, fetus 2 [O32.1XX2]       Hospital Course  Patient is a 40 y.o. female  36w6d presented with Scheduled  - twins with chronic hypertension, superimposed pre-eclampsia.  For events surrounding her delivery please see delivery/op note.  Her postpartum course was uneventful and today POD#3, she is ready for discharge.  She meets all milestones and criteria for discharge and instructions were reviewed and she voiced understanding.     Procedures Performed  Procedure(s):   SECTION PRIMARY       Consults:   Consults     No orders found from 2020 to 2020.          Pertinent Test Results: Hg 7.2 grams     Condition on Discharge:  Stable     Discharge Disposition  Home or Self Care    Discharge Medications     Discharge Medications      New Medications      Instructions Start Date   ferrous sulfate 325 (65 FE) MG tablet   325 mg, Oral, Daily With Breakfast      ibuprofen 600 MG tablet  Commonly known as:  ADVIL,MOTRIN   600 mg, Oral, Every 8 Hours PRN      oxyCODONE-acetaminophen 5-325 MG per tablet  Commonly known as:  PERCOCET   2 tablets, Oral, Every 6 Hours PRN         Changes to Medications      Instructions Start Date   albuterol sulfate  (90 Base) MCG/ACT inhaler  Commonly known as:  PROVENTIL HFA;VENTOLIN HFA;PROAIR HFA  What changed:  Another medication with the same name was removed. Continue taking this medication, and follow the directions you see here.   2 puffs, Inhalation, Every 6 Hours PRN      levothyroxine 125 MCG tablet  Commonly known as:  SYNTHROID, LEVOTHROID  What changed:  Another medication with the same name was removed. Continue taking this medication, and follow the directions you see here.   125 mcg, Oral, Daily      NIFEdipine CC 60 MG 24 hr tablet  Commonly known as:  ADALAT  CC  What changed:  Another medication with the same name was removed. Continue taking this medication, and follow the directions you see here.   60 mg, Oral, Daily         Continue These Medications      Instructions Start Date   fluticasone 50 MCG/ACT nasal spray  Commonly known as:  FLONASE   SHAKE LQ AND U 1 SPR IEN D      folic acid 1 MG tablet  Commonly known as:  FOLVITE   1,000 mcg, Oral, Daily      loratadine 10 MG tablet  Commonly known as:  CLARITIN   10 mg, Oral, Daily      mometasone 50 MCG/ACT nasal spray  Commonly known as:  NASONEX   2 sprays, Nasal, Daily         Stop These Medications    ASPIRIN PO     azithromycin 250 MG tablet  Commonly known as:  Zithromax Z-Crispin     guaiFENesin 100 MG/5ML syrup  Commonly known as:  ROBITUSSIN            Discharge Diet:   Diet Instructions     Advance Diet As Tolerated            Activity at Discharge:   Activity Instructions     Other Instructions (Specify)      No driving or tub baths for 2 weeks, No heavy lifting and pelvic rest for 6 weeks     Pelvic Rest            Follow-up Appointments  Future Appointments   Date Time Provider Department Center   5/18/2020  2:10 PM Chad Baum MD [facility] None     Additional Instructions for the Follow-ups that You Need to Schedule     Discharge Follow-up with Specialty: Dr. Baum; 2 Weeks   As directed      Specialty:  Dr. Baum    Follow Up:  2 Weeks               Test Results Pending at Discharge       Declan Desouza MD  05/09/20  10:20

## 2020-05-09 NOTE — PROGRESS NOTES
Section Progress Note    Assessment/Plan     Status post  section: Doing well postoperatively.     1) Postpartum care immediately after delivery : Doing well, discharge instructions reviewed.   2) Anemia postpartum - Hg 8.0 at admission, post op 7.2 grams. Add iron     Rh status: O negative - Rhogam given times one   Rubella: immune  Gender: Male/Male     Subjective     Postpartum Day 3:  Delivery    The patient feels well. The patient denies emotional concerns. Pain is well controlled with current medications. The babies are well.Urinary output is adequate. The patient is ambulating well. The patient is tolerating a normal diet. Patient reports flatus.    Objective     Vital signs in last 24 hours:  Temp:  [97.6 °F (36.4 °C)-98.6 °F (37 °C)] 97.6 °F (36.4 °C)  Heart Rate:  [68-81] 75  Resp:  [16] 16  BP: (125-156)/(70-86) 139/81      General:    alert, appears stated age and cooperative   Bowel Sounds:  active   Lochia:  appropriate   Uterine Fundus:   firm   Incision:  healing well, no significant drainage, no dehiscence, no significant erythema   DVT Evaluation:  No evidence of DVT seen on physical exam.     Lab Results   Component Value Date    WBC 7.77 2020    HGB 7.2 (L) 2020    HCT 21.3 (L) 2020    MCV 93.8 2020     2020         Declan Desouza MD  2020  10:13

## 2020-05-10 LAB
BH BB BLOOD EXPIRATION DATE: NORMAL
BH BB BLOOD EXPIRATION DATE: NORMAL
BH BB BLOOD TYPE BARCODE: 9500
BH BB BLOOD TYPE BARCODE: 9500
BH BB DISPENSE STATUS: NORMAL
BH BB DISPENSE STATUS: NORMAL
BH BB PRODUCT CODE: NORMAL
BH BB PRODUCT CODE: NORMAL
BH BB UNIT NUMBER: NORMAL
BH BB UNIT NUMBER: NORMAL
CROSSMATCH INTERPRETATION: NORMAL
CROSSMATCH INTERPRETATION: NORMAL
UNIT  ABO: NORMAL
UNIT  ABO: NORMAL
UNIT  RH: NORMAL
UNIT  RH: NORMAL

## 2020-05-14 ENCOUNTER — HOSPITAL ENCOUNTER (OUTPATIENT)
Dept: ULTRASOUND IMAGING | Facility: HOSPITAL | Age: 40
Discharge: HOME | End: 2020-05-14

## 2020-05-16 ENCOUNTER — DOCUMENTATION (OUTPATIENT)
Dept: OBSTETRICS AND GYNECOLOGY | Facility: HOSPITAL | Age: 40
End: 2020-05-16

## 2020-05-16 ENCOUNTER — HOSPITAL ENCOUNTER (INPATIENT)
Facility: HOSPITAL | Age: 40
LOS: 3 days | Discharge: HOME | End: 2020-05-19
Attending: EMERGENCY MEDICINE | Admitting: OBSTETRICS & GYNECOLOGY

## 2020-05-16 ENCOUNTER — APPOINTMENT (OUTPATIENT)
Dept: GENERAL RADIOLOGY | Facility: HOSPITAL | Age: 40
End: 2020-05-16

## 2020-05-16 ENCOUNTER — APPOINTMENT (OUTPATIENT)
Dept: ULTRASOUND IMAGING | Facility: HOSPITAL | Age: 40
End: 2020-05-16

## 2020-05-16 ENCOUNTER — ANESTHESIA (OUTPATIENT)
Dept: PERIOP | Facility: HOSPITAL | Age: 40
End: 2020-05-16

## 2020-05-16 ENCOUNTER — ANESTHESIA EVENT (OUTPATIENT)
Dept: PERIOP | Facility: HOSPITAL | Age: 40
End: 2020-05-16

## 2020-05-16 DIAGNOSIS — D62 ACUTE BLOOD LOSS ANEMIA: ICD-10-CM

## 2020-05-16 DIAGNOSIS — R79.1 ELEVATED INR: ICD-10-CM

## 2020-05-16 DIAGNOSIS — Z98.891 S/P CESAREAN SECTION: ICD-10-CM

## 2020-05-16 DIAGNOSIS — I10 HYPERTENSION, UNSPECIFIED TYPE: ICD-10-CM

## 2020-05-16 LAB
ABO GROUP BLD: NORMAL
ALBUMIN SERPL-MCNC: 2.7 G/DL (ref 3.5–5.2)
ALBUMIN SERPL-MCNC: 3.3 G/DL (ref 3.5–5.2)
ALBUMIN SERPL-MCNC: 3.6 G/DL (ref 3.5–5.2)
ALBUMIN/GLOB SERPL: 1.4 G/DL
ALBUMIN/GLOB SERPL: 1.5 G/DL
ALBUMIN/GLOB SERPL: 1.7 G/DL
ALP SERPL-CCNC: 128 U/L (ref 39–117)
ALP SERPL-CCNC: 63 U/L (ref 39–117)
ALP SERPL-CCNC: 85 U/L (ref 39–117)
ALT SERPL W P-5'-P-CCNC: 17 U/L (ref 1–33)
ALT SERPL W P-5'-P-CCNC: 20 U/L (ref 1–33)
ALT SERPL W P-5'-P-CCNC: 20 U/L (ref 1–33)
ANION GAP SERPL CALCULATED.3IONS-SCNC: 10.5 MMOL/L (ref 5–15)
ANION GAP SERPL CALCULATED.3IONS-SCNC: 10.9 MMOL/L (ref 5–15)
ANION GAP SERPL CALCULATED.3IONS-SCNC: 18.6 MMOL/L (ref 5–15)
ANISOCYTOSIS BLD QL: ABNORMAL
APTT PPP: 24.2 SECONDS (ref 22.7–35.4)
APTT PPP: 27.8 SECONDS (ref 22.7–35.4)
APTT PPP: 44.1 SECONDS (ref 22.7–35.4)
AST SERPL-CCNC: 31 U/L (ref 1–32)
AST SERPL-CCNC: 35 U/L (ref 1–32)
AST SERPL-CCNC: 39 U/L (ref 1–32)
BACTERIA UR QL AUTO: ABNORMAL /HPF
BASOPHILS # BLD AUTO: 0.01 10*3/MM3 (ref 0–0.2)
BASOPHILS # BLD AUTO: 0.02 10*3/MM3 (ref 0–0.2)
BASOPHILS NFR BLD AUTO: 0.1 % (ref 0–1.5)
BASOPHILS NFR BLD AUTO: 0.1 % (ref 0–1.5)
BILIRUB SERPL-MCNC: 0.4 MG/DL (ref 0.2–1.2)
BILIRUB SERPL-MCNC: 0.8 MG/DL (ref 0.2–1.2)
BILIRUB SERPL-MCNC: 0.8 MG/DL (ref 0.2–1.2)
BILIRUB UR QL STRIP: NEGATIVE
BLD GP AB SCN SERPL QL: POSITIVE
BUN BLD-MCNC: 25 MG/DL (ref 6–20)
BUN BLD-MCNC: 26 MG/DL (ref 6–20)
BUN BLD-MCNC: 28 MG/DL (ref 6–20)
BUN/CREAT SERPL: 14.6 (ref 7–25)
BUN/CREAT SERPL: 16.1 (ref 7–25)
BUN/CREAT SERPL: 20.5 (ref 7–25)
CALCIUM SPEC-SCNC: 6.4 MG/DL (ref 8.6–10.5)
CALCIUM SPEC-SCNC: 7.5 MG/DL (ref 8.6–10.5)
CALCIUM SPEC-SCNC: 8 MG/DL (ref 8.6–10.5)
CHLORIDE SERPL-SCNC: 102 MMOL/L (ref 98–107)
CHLORIDE SERPL-SCNC: 102 MMOL/L (ref 98–107)
CHLORIDE SERPL-SCNC: 104 MMOL/L (ref 98–107)
CLARITY UR: ABNORMAL
CO2 SERPL-SCNC: 18.4 MMOL/L (ref 22–29)
CO2 SERPL-SCNC: 22.1 MMOL/L (ref 22–29)
CO2 SERPL-SCNC: 22.5 MMOL/L (ref 22–29)
COLOR UR: ABNORMAL
CREAT BLD-MCNC: 1.27 MG/DL (ref 0.57–1)
CREAT BLD-MCNC: 1.55 MG/DL (ref 0.57–1)
CREAT BLD-MCNC: 1.92 MG/DL (ref 0.57–1)
D-LACTATE SERPL-SCNC: 2.2 MMOL/L (ref 0.5–2)
DEPRECATED RDW RBC AUTO: 45.8 FL (ref 37–54)
DEPRECATED RDW RBC AUTO: 46.8 FL (ref 37–54)
DEPRECATED RDW RBC AUTO: 47.4 FL (ref 37–54)
DEPRECATED RDW RBC AUTO: 48.6 FL (ref 37–54)
EOSINOPHIL # BLD AUTO: 0.01 10*3/MM3 (ref 0–0.4)
EOSINOPHIL # BLD AUTO: 0.01 10*3/MM3 (ref 0–0.4)
EOSINOPHIL NFR BLD AUTO: 0.1 % (ref 0.3–6.2)
EOSINOPHIL NFR BLD AUTO: 0.1 % (ref 0.3–6.2)
ERYTHROCYTE [DISTWIDTH] IN BLOOD BY AUTOMATED COUNT: 14.3 % (ref 12.3–15.4)
ERYTHROCYTE [DISTWIDTH] IN BLOOD BY AUTOMATED COUNT: 14.4 % (ref 12.3–15.4)
ERYTHROCYTE [DISTWIDTH] IN BLOOD BY AUTOMATED COUNT: 14.5 % (ref 12.3–15.4)
ERYTHROCYTE [DISTWIDTH] IN BLOOD BY AUTOMATED COUNT: 14.9 % (ref 12.3–15.4)
FIBRINOGEN PPP-MCNC: 176 MG/DL (ref 219–464)
FIBRINOGEN PPP-MCNC: 225 MG/DL (ref 219–464)
GFR SERPL CREATININE-BSD FRML MDRD: 29 ML/MIN/1.73
GFR SERPL CREATININE-BSD FRML MDRD: 37 ML/MIN/1.73
GFR SERPL CREATININE-BSD FRML MDRD: 46 ML/MIN/1.73
GLOBULIN UR ELPH-MCNC: 1.8 GM/DL
GLOBULIN UR ELPH-MCNC: 2.1 GM/DL
GLOBULIN UR ELPH-MCNC: 2.4 GM/DL
GLUCOSE BLD-MCNC: 108 MG/DL (ref 65–99)
GLUCOSE BLD-MCNC: 108 MG/DL (ref 65–99)
GLUCOSE BLD-MCNC: 163 MG/DL (ref 65–99)
GLUCOSE BLDC GLUCOMTR-MCNC: 141 MG/DL (ref 70–130)
GLUCOSE UR STRIP-MCNC: ABNORMAL MG/DL
HCT VFR BLD AUTO: 14.4 % (ref 34–46.6)
HCT VFR BLD AUTO: 15.7 % (ref 34–46.6)
HCT VFR BLD AUTO: 16 % (ref 34–46.6)
HCT VFR BLD AUTO: 21.5 % (ref 34–46.6)
HGB BLD-MCNC: 4.9 G/DL (ref 12–15.9)
HGB BLD-MCNC: 5.3 G/DL (ref 12–15.9)
HGB BLD-MCNC: 5.4 G/DL (ref 12–15.9)
HGB BLD-MCNC: 7.4 G/DL (ref 12–15.9)
HGB UR QL STRIP.AUTO: ABNORMAL
HYALINE CASTS UR QL AUTO: ABNORMAL /LPF
IMM GRANULOCYTES # BLD AUTO: 0.15 10*3/MM3 (ref 0–0.05)
IMM GRANULOCYTES # BLD AUTO: 0.16 10*3/MM3 (ref 0–0.05)
IMM GRANULOCYTES NFR BLD AUTO: 1.2 % (ref 0–0.5)
IMM GRANULOCYTES NFR BLD AUTO: 1.3 % (ref 0–0.5)
INR PPP: 1.17 (ref 0.9–1.1)
INR PPP: 1.3 (ref 0.9–1.1)
INR PPP: 1.81 (ref 0.9–1.1)
INR PPP: 1.95 (ref 0.9–1.1)
KETONES UR QL STRIP: NEGATIVE
LACTATE HOLD SPECIMEN: NORMAL
LEUKOCYTE ESTERASE UR QL STRIP.AUTO: ABNORMAL
LIPASE SERPL-CCNC: 22 U/L (ref 13–60)
LYMPHOCYTES # BLD AUTO: 0.33 10*3/MM3 (ref 0.7–3.1)
LYMPHOCYTES # BLD AUTO: 0.64 10*3/MM3 (ref 0.7–3.1)
LYMPHOCYTES # BLD MANUAL: 1.09 10*3/MM3 (ref 0.7–3.1)
LYMPHOCYTES NFR BLD AUTO: 2.4 % (ref 19.6–45.3)
LYMPHOCYTES NFR BLD AUTO: 5.4 % (ref 19.6–45.3)
LYMPHOCYTES NFR BLD MANUAL: 11 % (ref 5–12)
LYMPHOCYTES NFR BLD MANUAL: 4 % (ref 19.6–45.3)
MCH RBC QN AUTO: 29.5 PG (ref 26.6–33)
MCH RBC QN AUTO: 30.6 PG (ref 26.6–33)
MCH RBC QN AUTO: 30.6 PG (ref 26.6–33)
MCH RBC QN AUTO: 32.1 PG (ref 26.6–33)
MCHC RBC AUTO-ENTMCNC: 33.8 G/DL (ref 31.5–35.7)
MCHC RBC AUTO-ENTMCNC: 33.8 G/DL (ref 31.5–35.7)
MCHC RBC AUTO-ENTMCNC: 34 G/DL (ref 31.5–35.7)
MCHC RBC AUTO-ENTMCNC: 34.4 G/DL (ref 31.5–35.7)
MCV RBC AUTO: 86.7 FL (ref 79–97)
MCV RBC AUTO: 88.8 FL (ref 79–97)
MCV RBC AUTO: 90.8 FL (ref 79–97)
MCV RBC AUTO: 95.2 FL (ref 79–97)
MONOCYTES # BLD AUTO: 0.9 10*3/MM3 (ref 0.1–0.9)
MONOCYTES # BLD AUTO: 0.95 10*3/MM3 (ref 0.1–0.9)
MONOCYTES # BLD AUTO: 2.99 10*3/MM3 (ref 0.1–0.9)
MONOCYTES NFR BLD AUTO: 6.5 % (ref 5–12)
MONOCYTES NFR BLD AUTO: 7.9 % (ref 5–12)
NEUTROPHILS # BLD AUTO: 10.2 10*3/MM3 (ref 1.7–7)
NEUTROPHILS # BLD AUTO: 12.49 10*3/MM3 (ref 1.7–7)
NEUTROPHILS # BLD AUTO: 23.13 10*3/MM3 (ref 1.7–7)
NEUTROPHILS NFR BLD AUTO: 85.2 % (ref 42.7–76)
NEUTROPHILS NFR BLD AUTO: 89.7 % (ref 42.7–76)
NEUTROPHILS NFR BLD MANUAL: 85 % (ref 42.7–76)
NITRITE UR QL STRIP: POSITIVE
NRBC BLD AUTO-RTO: 0.1 /100 WBC (ref 0–0.2)
NRBC BLD AUTO-RTO: 0.3 /100 WBC (ref 0–0.2)
PH UR STRIP.AUTO: 6 [PH] (ref 5–8)
PLAT MORPH BLD: NORMAL
PLATELET # BLD AUTO: 114 10*3/MM3 (ref 140–450)
PLATELET # BLD AUTO: 135 10*3/MM3 (ref 140–450)
PLATELET # BLD AUTO: 182 10*3/MM3 (ref 140–450)
PLATELET # BLD AUTO: 269 10*3/MM3 (ref 140–450)
PMV BLD AUTO: 8.7 FL (ref 6–12)
PMV BLD AUTO: 9 FL (ref 6–12)
PMV BLD AUTO: 9.2 FL (ref 6–12)
PMV BLD AUTO: 9.7 FL (ref 6–12)
POIKILOCYTOSIS BLD QL SMEAR: ABNORMAL
POTASSIUM BLD-SCNC: 4.2 MMOL/L (ref 3.5–5.2)
POTASSIUM BLD-SCNC: 4.4 MMOL/L (ref 3.5–5.2)
POTASSIUM BLD-SCNC: 5.6 MMOL/L (ref 3.5–5.2)
PROT SERPL-MCNC: 4.5 G/DL (ref 6–8.5)
PROT SERPL-MCNC: 5.7 G/DL (ref 6–8.5)
PROT SERPL-MCNC: 5.7 G/DL (ref 6–8.5)
PROT UR QL STRIP: ABNORMAL
PROTHROMBIN TIME: 14.6 SECONDS (ref 11.7–14.2)
PROTHROMBIN TIME: 15.8 SECONDS (ref 11.7–14.2)
PROTHROMBIN TIME: 20.7 SECONDS (ref 11.7–14.2)
PROTHROMBIN TIME: 21.9 SECONDS (ref 11.7–14.2)
RBC # BLD AUTO: 1.66 10*6/MM3 (ref 3.77–5.28)
RBC # BLD AUTO: 1.68 10*6/MM3 (ref 3.77–5.28)
RBC # BLD AUTO: 1.73 10*6/MM3 (ref 3.77–5.28)
RBC # BLD AUTO: 2.42 10*6/MM3 (ref 3.77–5.28)
RBC # UR: ABNORMAL /HPF
REF LAB TEST METHOD: ABNORMAL
RESIDUAL RHIG DETECTED: NORMAL
RH BLD: NEGATIVE
ROULEAUX BLD QL SMEAR: ABNORMAL
SARS-COV-2 RNA RESP QL NAA+PROBE: NOT DETECTED
SCHISTOCYTES BLD QL SMEAR: ABNORMAL
SODIUM BLD-SCNC: 135 MMOL/L (ref 136–145)
SODIUM BLD-SCNC: 137 MMOL/L (ref 136–145)
SODIUM BLD-SCNC: 139 MMOL/L (ref 136–145)
SP GR UR STRIP: 1.03 (ref 1–1.03)
SQUAMOUS #/AREA URNS HPF: ABNORMAL /HPF
T&S EXPIRATION DATE: NORMAL
UROBILINOGEN UR QL STRIP: ABNORMAL
WBC MORPH BLD: NORMAL
WBC NRBC COR # BLD: 11.96 10*3/MM3 (ref 3.4–10.8)
WBC NRBC COR # BLD: 13.91 10*3/MM3 (ref 3.4–10.8)
WBC NRBC COR # BLD: 27.21 10*3/MM3 (ref 3.4–10.8)
WBC NRBC COR # BLD: 8.43 10*3/MM3 (ref 3.4–10.8)
WBC UR QL AUTO: ABNORMAL /HPF

## 2020-05-16 PROCEDURE — 85027 COMPLETE CBC AUTOMATED: CPT | Performed by: INTERNAL MEDICINE

## 2020-05-16 PROCEDURE — 85384 FIBRINOGEN ACTIVITY: CPT | Performed by: OBSTETRICS & GYNECOLOGY

## 2020-05-16 PROCEDURE — P9017 PLASMA 1 DONOR FRZ W/IN 8 HR: HCPCS

## 2020-05-16 PROCEDURE — 86927 PLASMA FRESH FROZEN: CPT

## 2020-05-16 PROCEDURE — 0UT90ZL RESECTION OF UTERUS, SUPRACERVICAL, OPEN APPROACH: ICD-10-PCS | Performed by: OBSTETRICS & GYNECOLOGY

## 2020-05-16 PROCEDURE — 10D17ZZ EXTRACTION OF PRODUCTS OF CONCEPTION, RETAINED, VIA NATURAL OR ARTIFICIAL OPENING: ICD-10-PCS | Performed by: OBSTETRICS & GYNECOLOGY

## 2020-05-16 PROCEDURE — 25010000002 ONDANSETRON PER 1 MG: Performed by: EMERGENCY MEDICINE

## 2020-05-16 PROCEDURE — 86900 BLOOD TYPING SEROLOGIC ABO: CPT

## 2020-05-16 PROCEDURE — 86901 BLOOD TYPING SEROLOGIC RH(D): CPT | Performed by: EMERGENCY MEDICINE

## 2020-05-16 PROCEDURE — 76856 US EXAM PELVIC COMPLETE: CPT

## 2020-05-16 PROCEDURE — P9035 PLATELET PHERES LEUKOREDUCED: HCPCS

## 2020-05-16 PROCEDURE — 85007 BL SMEAR W/DIFF WBC COUNT: CPT | Performed by: NURSE ANESTHETIST, CERTIFIED REGISTERED

## 2020-05-16 PROCEDURE — 85730 THROMBOPLASTIN TIME PARTIAL: CPT | Performed by: NURSE ANESTHETIST, CERTIFIED REGISTERED

## 2020-05-16 PROCEDURE — 83690 ASSAY OF LIPASE: CPT | Performed by: EMERGENCY MEDICINE

## 2020-05-16 PROCEDURE — 0UB50ZZ EXCISION OF RIGHT FALLOPIAN TUBE, OPEN APPROACH: ICD-10-PCS | Performed by: OBSTETRICS & GYNECOLOGY

## 2020-05-16 PROCEDURE — 85025 COMPLETE CBC W/AUTO DIFF WBC: CPT | Performed by: EMERGENCY MEDICINE

## 2020-05-16 PROCEDURE — 85384 FIBRINOGEN ACTIVITY: CPT | Performed by: NURSE ANESTHETIST, CERTIFIED REGISTERED

## 2020-05-16 PROCEDURE — 86850 RBC ANTIBODY SCREEN: CPT | Performed by: EMERGENCY MEDICINE

## 2020-05-16 PROCEDURE — G0378 HOSPITAL OBSERVATION PER HR: HCPCS

## 2020-05-16 PROCEDURE — 86920 COMPATIBILITY TEST SPIN: CPT

## 2020-05-16 PROCEDURE — 87635 SARS-COV-2 COVID-19 AMP PRB: CPT | Performed by: EMERGENCY MEDICINE

## 2020-05-16 PROCEDURE — 25010000002 SUCCINYLCHOLINE PER 20 MG: Performed by: NURSE ANESTHETIST, CERTIFIED REGISTERED

## 2020-05-16 PROCEDURE — 86870 RBC ANTIBODY IDENTIFICATION: CPT | Performed by: EMERGENCY MEDICINE

## 2020-05-16 PROCEDURE — 85025 COMPLETE CBC W/AUTO DIFF WBC: CPT | Performed by: OBSTETRICS & GYNECOLOGY

## 2020-05-16 PROCEDURE — 80053 COMPREHEN METABOLIC PANEL: CPT | Performed by: NURSE ANESTHETIST, CERTIFIED REGISTERED

## 2020-05-16 PROCEDURE — 25010000002 PROPOFOL 10 MG/ML EMULSION: Performed by: NURSE ANESTHETIST, CERTIFIED REGISTERED

## 2020-05-16 PROCEDURE — 85610 PROTHROMBIN TIME: CPT | Performed by: OBSTETRICS & GYNECOLOGY

## 2020-05-16 PROCEDURE — 86900 BLOOD TYPING SEROLOGIC ABO: CPT | Performed by: EMERGENCY MEDICINE

## 2020-05-16 PROCEDURE — 83605 ASSAY OF LACTIC ACID: CPT | Performed by: INTERNAL MEDICINE

## 2020-05-16 PROCEDURE — 99284 EMERGENCY DEPT VISIT MOD MDM: CPT

## 2020-05-16 PROCEDURE — 85025 COMPLETE CBC W/AUTO DIFF WBC: CPT | Performed by: NURSE ANESTHETIST, CERTIFIED REGISTERED

## 2020-05-16 PROCEDURE — 85610 PROTHROMBIN TIME: CPT | Performed by: NURSE ANESTHETIST, CERTIFIED REGISTERED

## 2020-05-16 PROCEDURE — 58180 PARTIAL HYSTERECTOMY: CPT | Performed by: OBSTETRICS & GYNECOLOGY

## 2020-05-16 PROCEDURE — 82962 GLUCOSE BLOOD TEST: CPT

## 2020-05-16 PROCEDURE — 59160 D & C AFTER DELIVERY: CPT | Performed by: OBSTETRICS & GYNECOLOGY

## 2020-05-16 PROCEDURE — 25010000002 MORPHINE PER 10 MG: Performed by: EMERGENCY MEDICINE

## 2020-05-16 PROCEDURE — 85610 PROTHROMBIN TIME: CPT | Performed by: EMERGENCY MEDICINE

## 2020-05-16 PROCEDURE — 25010000002 FENTANYL CITRATE (PF) 100 MCG/2ML SOLUTION: Performed by: NURSE ANESTHETIST, CERTIFIED REGISTERED

## 2020-05-16 PROCEDURE — 81001 URINALYSIS AUTO W/SCOPE: CPT | Performed by: EMERGENCY MEDICINE

## 2020-05-16 PROCEDURE — 36430 TRANSFUSION BLD/BLD COMPNT: CPT

## 2020-05-16 PROCEDURE — 25010000002 FENTANYL CITRATE (PF) 100 MCG/2ML SOLUTION: Performed by: ANESTHESIOLOGY

## 2020-05-16 PROCEDURE — 86901 BLOOD TYPING SEROLOGIC RH(D): CPT

## 2020-05-16 PROCEDURE — 88307 TISSUE EXAM BY PATHOLOGIST: CPT | Performed by: OBSTETRICS & GYNECOLOGY

## 2020-05-16 PROCEDURE — 88304 TISSUE EXAM BY PATHOLOGIST: CPT | Performed by: OBSTETRICS & GYNECOLOGY

## 2020-05-16 PROCEDURE — 85730 THROMBOPLASTIN TIME PARTIAL: CPT | Performed by: OBSTETRICS & GYNECOLOGY

## 2020-05-16 PROCEDURE — P9016 RBC LEUKOCYTES REDUCED: HCPCS

## 2020-05-16 PROCEDURE — 80053 COMPREHEN METABOLIC PANEL: CPT | Performed by: OBSTETRICS & GYNECOLOGY

## 2020-05-16 PROCEDURE — 80053 COMPREHEN METABOLIC PANEL: CPT | Performed by: EMERGENCY MEDICINE

## 2020-05-16 PROCEDURE — 25010000002 HYDROMORPHONE PER 4 MG: Performed by: NURSE ANESTHETIST, CERTIFIED REGISTERED

## 2020-05-16 PROCEDURE — 36415 COLL VENOUS BLD VENIPUNCTURE: CPT

## 2020-05-16 DEVICE — SEAL HEMO SURG ARISTA/AH ABS/PWDR 3GM: Type: IMPLANTABLE DEVICE | Site: ABDOMEN | Status: FUNCTIONAL

## 2020-05-16 RX ORDER — SUCCINYLCHOLINE CHLORIDE 20 MG/ML
INJECTION INTRAMUSCULAR; INTRAVENOUS AS NEEDED
Status: DISCONTINUED | OUTPATIENT
Start: 2020-05-16 | End: 2020-05-16 | Stop reason: SURG

## 2020-05-16 RX ORDER — NALOXONE HCL 0.4 MG/ML
0.2 VIAL (ML) INJECTION AS NEEDED
Status: DISCONTINUED | OUTPATIENT
Start: 2020-05-16 | End: 2020-05-16

## 2020-05-16 RX ORDER — HYDROMORPHONE HCL 110MG/55ML
PATIENT CONTROLLED ANALGESIA SYRINGE INTRAVENOUS AS NEEDED
Status: DISCONTINUED | OUTPATIENT
Start: 2020-05-16 | End: 2020-05-16 | Stop reason: SURG

## 2020-05-16 RX ORDER — LEVOTHYROXINE SODIUM 0.12 MG/1
125 TABLET ORAL
Status: DISCONTINUED | OUTPATIENT
Start: 2020-05-16 | End: 2020-05-19 | Stop reason: HOSPADM

## 2020-05-16 RX ORDER — HYDROCODONE BITARTRATE AND ACETAMINOPHEN 5; 325 MG/1; MG/1
1 TABLET ORAL EVERY 4 HOURS PRN
Status: DISCONTINUED | OUTPATIENT
Start: 2020-05-16 | End: 2020-05-19 | Stop reason: HOSPADM

## 2020-05-16 RX ORDER — LABETALOL HYDROCHLORIDE 5 MG/ML
20 INJECTION, SOLUTION INTRAVENOUS ONCE
Status: DISCONTINUED | OUTPATIENT
Start: 2020-05-16 | End: 2020-05-16

## 2020-05-16 RX ORDER — ONDANSETRON 4 MG/1
4 TABLET, FILM COATED ORAL EVERY 6 HOURS PRN
Status: DISCONTINUED | OUTPATIENT
Start: 2020-05-16 | End: 2020-05-19 | Stop reason: HOSPADM

## 2020-05-16 RX ORDER — DOXYCYCLINE 100 MG/10ML
200 INJECTION, POWDER, LYOPHILIZED, FOR SOLUTION INTRAVENOUS ONCE
Status: DISCONTINUED | OUTPATIENT
Start: 2020-05-16 | End: 2020-05-16

## 2020-05-16 RX ORDER — LIDOCAINE HYDROCHLORIDE 10 MG/ML
0.5 INJECTION, SOLUTION EPIDURAL; INFILTRATION; INTRACAUDAL; PERINEURAL ONCE AS NEEDED
Status: DISCONTINUED | OUTPATIENT
Start: 2020-05-16 | End: 2020-05-16 | Stop reason: HOSPADM

## 2020-05-16 RX ORDER — ONDANSETRON 2 MG/ML
4 INJECTION INTRAMUSCULAR; INTRAVENOUS EVERY 6 HOURS PRN
Status: DISCONTINUED | OUTPATIENT
Start: 2020-05-16 | End: 2020-05-19 | Stop reason: HOSPADM

## 2020-05-16 RX ORDER — CARBOPROST TROMETHAMINE 250 UG/ML
250 INJECTION, SOLUTION INTRAMUSCULAR ONCE
Status: COMPLETED | OUTPATIENT
Start: 2020-05-16 | End: 2020-05-16

## 2020-05-16 RX ORDER — DIPHENHYDRAMINE HYDROCHLORIDE 50 MG/ML
12.5 INJECTION INTRAMUSCULAR; INTRAVENOUS
Status: DISCONTINUED | OUTPATIENT
Start: 2020-05-16 | End: 2020-05-16

## 2020-05-16 RX ORDER — FENTANYL CITRATE 50 UG/ML
INJECTION, SOLUTION INTRAMUSCULAR; INTRAVENOUS AS NEEDED
Status: DISCONTINUED | OUTPATIENT
Start: 2020-05-16 | End: 2020-05-16 | Stop reason: SURG

## 2020-05-16 RX ORDER — MIDAZOLAM HYDROCHLORIDE 1 MG/ML
1 INJECTION INTRAMUSCULAR; INTRAVENOUS
Status: DISCONTINUED | OUTPATIENT
Start: 2020-05-16 | End: 2020-05-16 | Stop reason: HOSPADM

## 2020-05-16 RX ORDER — HYDROMORPHONE HYDROCHLORIDE 1 MG/ML
0.5 INJECTION, SOLUTION INTRAMUSCULAR; INTRAVENOUS; SUBCUTANEOUS
Status: DISCONTINUED | OUTPATIENT
Start: 2020-05-16 | End: 2020-05-19 | Stop reason: HOSPADM

## 2020-05-16 RX ORDER — MIDAZOLAM HYDROCHLORIDE 1 MG/ML
2 INJECTION INTRAMUSCULAR; INTRAVENOUS
Status: DISCONTINUED | OUTPATIENT
Start: 2020-05-16 | End: 2020-05-16 | Stop reason: HOSPADM

## 2020-05-16 RX ORDER — PROMETHAZINE HYDROCHLORIDE 25 MG/1
25 TABLET ORAL ONCE AS NEEDED
Status: DISCONTINUED | OUTPATIENT
Start: 2020-05-16 | End: 2020-05-16

## 2020-05-16 RX ORDER — HYDRALAZINE HYDROCHLORIDE 20 MG/ML
5 INJECTION INTRAMUSCULAR; INTRAVENOUS
Status: DISCONTINUED | OUTPATIENT
Start: 2020-05-16 | End: 2020-05-16

## 2020-05-16 RX ORDER — SODIUM CHLORIDE 0.9 % (FLUSH) 0.9 %
10 SYRINGE (ML) INJECTION AS NEEDED
Status: DISCONTINUED | OUTPATIENT
Start: 2020-05-16 | End: 2020-05-19 | Stop reason: HOSPADM

## 2020-05-16 RX ORDER — NIFEDIPINE 60 MG/1
60 TABLET, EXTENDED RELEASE ORAL DAILY
Status: DISCONTINUED | OUTPATIENT
Start: 2020-05-16 | End: 2020-05-17

## 2020-05-16 RX ORDER — EPHEDRINE SULFATE 50 MG/ML
5 INJECTION, SOLUTION INTRAVENOUS ONCE AS NEEDED
Status: DISCONTINUED | OUTPATIENT
Start: 2020-05-16 | End: 2020-05-16

## 2020-05-16 RX ORDER — PROMETHAZINE HYDROCHLORIDE 25 MG/ML
12.5 INJECTION, SOLUTION INTRAMUSCULAR; INTRAVENOUS ONCE AS NEEDED
Status: DISCONTINUED | OUTPATIENT
Start: 2020-05-16 | End: 2020-05-16

## 2020-05-16 RX ORDER — LIDOCAINE HYDROCHLORIDE 20 MG/ML
INJECTION, SOLUTION INFILTRATION; PERINEURAL AS NEEDED
Status: DISCONTINUED | OUTPATIENT
Start: 2020-05-16 | End: 2020-05-16 | Stop reason: SURG

## 2020-05-16 RX ORDER — SODIUM CHLORIDE 9 MG/ML
INJECTION, SOLUTION INTRAVENOUS CONTINUOUS PRN
Status: DISCONTINUED | OUTPATIENT
Start: 2020-05-16 | End: 2020-05-16 | Stop reason: SURG

## 2020-05-16 RX ORDER — MEPERIDINE HYDROCHLORIDE 25 MG/ML
12.5 INJECTION INTRAMUSCULAR; INTRAVENOUS; SUBCUTANEOUS
Status: DISCONTINUED | OUTPATIENT
Start: 2020-05-16 | End: 2020-05-16

## 2020-05-16 RX ORDER — HYDROMORPHONE HYDROCHLORIDE 1 MG/ML
0.5 INJECTION, SOLUTION INTRAMUSCULAR; INTRAVENOUS; SUBCUTANEOUS
Status: DISCONTINUED | OUTPATIENT
Start: 2020-05-16 | End: 2020-05-16

## 2020-05-16 RX ORDER — SODIUM CHLORIDE 0.9 % (FLUSH) 0.9 %
3-10 SYRINGE (ML) INJECTION AS NEEDED
Status: DISCONTINUED | OUTPATIENT
Start: 2020-05-16 | End: 2020-05-16 | Stop reason: HOSPADM

## 2020-05-16 RX ORDER — DIPHENHYDRAMINE HCL 25 MG
25 CAPSULE ORAL
Status: DISCONTINUED | OUTPATIENT
Start: 2020-05-16 | End: 2020-05-16

## 2020-05-16 RX ORDER — DOCUSATE SODIUM 100 MG/1
100 CAPSULE, LIQUID FILLED ORAL 2 TIMES DAILY PRN
Status: DISCONTINUED | OUTPATIENT
Start: 2020-05-16 | End: 2020-05-19 | Stop reason: HOSPADM

## 2020-05-16 RX ORDER — FAMOTIDINE 10 MG/ML
20 INJECTION, SOLUTION INTRAVENOUS ONCE
Status: COMPLETED | OUTPATIENT
Start: 2020-05-16 | End: 2020-05-16

## 2020-05-16 RX ORDER — HYDROCODONE BITARTRATE AND ACETAMINOPHEN 7.5; 325 MG/1; MG/1
1 TABLET ORAL ONCE AS NEEDED
Status: DISCONTINUED | OUTPATIENT
Start: 2020-05-16 | End: 2020-05-16

## 2020-05-16 RX ORDER — SODIUM CHLORIDE, SODIUM LACTATE, POTASSIUM CHLORIDE, CALCIUM CHLORIDE 600; 310; 30; 20 MG/100ML; MG/100ML; MG/100ML; MG/100ML
9 INJECTION, SOLUTION INTRAVENOUS CONTINUOUS
Status: DISCONTINUED | OUTPATIENT
Start: 2020-05-16 | End: 2020-05-16

## 2020-05-16 RX ORDER — FLUMAZENIL 0.1 MG/ML
0.2 INJECTION INTRAVENOUS AS NEEDED
Status: DISCONTINUED | OUTPATIENT
Start: 2020-05-16 | End: 2020-05-16

## 2020-05-16 RX ORDER — PROMETHAZINE HYDROCHLORIDE 25 MG/1
25 SUPPOSITORY RECTAL ONCE AS NEEDED
Status: DISCONTINUED | OUTPATIENT
Start: 2020-05-16 | End: 2020-05-16

## 2020-05-16 RX ORDER — ROCURONIUM BROMIDE 10 MG/ML
INJECTION, SOLUTION INTRAVENOUS AS NEEDED
Status: DISCONTINUED | OUTPATIENT
Start: 2020-05-16 | End: 2020-05-16 | Stop reason: SURG

## 2020-05-16 RX ORDER — OXYCODONE AND ACETAMINOPHEN 7.5; 325 MG/1; MG/1
1 TABLET ORAL ONCE AS NEEDED
Status: DISCONTINUED | OUTPATIENT
Start: 2020-05-16 | End: 2020-05-16

## 2020-05-16 RX ORDER — PROMETHAZINE HYDROCHLORIDE 25 MG/ML
6.25 INJECTION, SOLUTION INTRAMUSCULAR; INTRAVENOUS
Status: DISCONTINUED | OUTPATIENT
Start: 2020-05-16 | End: 2020-05-16

## 2020-05-16 RX ORDER — SODIUM CHLORIDE, SODIUM LACTATE, POTASSIUM CHLORIDE, CALCIUM CHLORIDE 600; 310; 30; 20 MG/100ML; MG/100ML; MG/100ML; MG/100ML
125 INJECTION, SOLUTION INTRAVENOUS CONTINUOUS
Status: DISCONTINUED | OUTPATIENT
Start: 2020-05-16 | End: 2020-05-17

## 2020-05-16 RX ORDER — SODIUM CHLORIDE 0.9 % (FLUSH) 0.9 %
10 SYRINGE (ML) INJECTION EVERY 12 HOURS SCHEDULED
Status: DISCONTINUED | OUTPATIENT
Start: 2020-05-16 | End: 2020-05-19 | Stop reason: HOSPADM

## 2020-05-16 RX ORDER — SODIUM CHLORIDE 0.9 % (FLUSH) 0.9 %
3 SYRINGE (ML) INJECTION EVERY 12 HOURS SCHEDULED
Status: DISCONTINUED | OUTPATIENT
Start: 2020-05-16 | End: 2020-05-16 | Stop reason: HOSPADM

## 2020-05-16 RX ORDER — ACETAMINOPHEN 325 MG/1
650 TABLET ORAL ONCE AS NEEDED
Status: DISCONTINUED | OUTPATIENT
Start: 2020-05-16 | End: 2020-05-16

## 2020-05-16 RX ORDER — ONDANSETRON 2 MG/ML
4 INJECTION INTRAMUSCULAR; INTRAVENOUS ONCE
Status: COMPLETED | OUTPATIENT
Start: 2020-05-16 | End: 2020-05-16

## 2020-05-16 RX ORDER — PROPOFOL 10 MG/ML
VIAL (ML) INTRAVENOUS AS NEEDED
Status: DISCONTINUED | OUTPATIENT
Start: 2020-05-16 | End: 2020-05-16 | Stop reason: SURG

## 2020-05-16 RX ORDER — FENTANYL CITRATE 50 UG/ML
50 INJECTION, SOLUTION INTRAMUSCULAR; INTRAVENOUS
Status: DISCONTINUED | OUTPATIENT
Start: 2020-05-16 | End: 2020-05-16 | Stop reason: HOSPADM

## 2020-05-16 RX ORDER — MAGNESIUM HYDROXIDE 1200 MG/15ML
LIQUID ORAL AS NEEDED
Status: DISCONTINUED | OUTPATIENT
Start: 2020-05-16 | End: 2020-05-16 | Stop reason: HOSPADM

## 2020-05-16 RX ORDER — NALOXONE HCL 0.4 MG/ML
0.4 VIAL (ML) INJECTION
Status: DISCONTINUED | OUTPATIENT
Start: 2020-05-16 | End: 2020-05-19 | Stop reason: HOSPADM

## 2020-05-16 RX ORDER — MORPHINE SULFATE 2 MG/ML
4 INJECTION, SOLUTION INTRAMUSCULAR; INTRAVENOUS ONCE
Status: COMPLETED | OUTPATIENT
Start: 2020-05-16 | End: 2020-05-16

## 2020-05-16 RX ORDER — ONDANSETRON 2 MG/ML
4 INJECTION INTRAMUSCULAR; INTRAVENOUS ONCE AS NEEDED
Status: DISCONTINUED | OUTPATIENT
Start: 2020-05-16 | End: 2020-05-16

## 2020-05-16 RX ORDER — FENTANYL CITRATE 50 UG/ML
50 INJECTION, SOLUTION INTRAMUSCULAR; INTRAVENOUS
Status: DISCONTINUED | OUTPATIENT
Start: 2020-05-16 | End: 2020-05-16

## 2020-05-16 RX ORDER — OXYTOCIN/RINGER'S LACTATE 10/500ML
PLASTIC BAG, INJECTION (ML) INTRAVENOUS CONTINUOUS PRN
Status: DISCONTINUED | OUTPATIENT
Start: 2020-05-16 | End: 2020-05-16 | Stop reason: SURG

## 2020-05-16 RX ORDER — OXYTOCIN/RINGER'S LACTATE 10/500ML
PLASTIC BAG, INJECTION (ML) INTRAVENOUS AS NEEDED
Status: DISCONTINUED | OUTPATIENT
Start: 2020-05-16 | End: 2020-05-16

## 2020-05-16 RX ORDER — OXYTOCIN/RINGER'S LACTATE 10/500ML
PLASTIC BAG, INJECTION (ML) INTRAVENOUS AS NEEDED
Status: DISCONTINUED | OUTPATIENT
Start: 2020-05-16 | End: 2020-05-16 | Stop reason: SURG

## 2020-05-16 RX ADMIN — HYDROCODONE BITARTRATE AND ACETAMINOPHEN 1 TABLET: 5; 325 TABLET ORAL at 17:40

## 2020-05-16 RX ADMIN — DOXYCYCLINE 200 MG: 100 INJECTION, POWDER, LYOPHILIZED, FOR SOLUTION INTRAVENOUS at 11:53

## 2020-05-16 RX ADMIN — FENTANYL CITRATE 50 MCG: 50 INJECTION INTRAMUSCULAR; INTRAVENOUS at 12:32

## 2020-05-16 RX ADMIN — FENTANYL CITRATE 50 MCG: 50 INJECTION INTRAMUSCULAR; INTRAVENOUS at 13:55

## 2020-05-16 RX ADMIN — FENTANYL CITRATE 50 MCG: 50 INJECTION, SOLUTION INTRAMUSCULAR; INTRAVENOUS at 11:36

## 2020-05-16 RX ADMIN — ONDANSETRON 4 MG: 2 INJECTION INTRAMUSCULAR; INTRAVENOUS at 08:35

## 2020-05-16 RX ADMIN — SODIUM CHLORIDE, POTASSIUM CHLORIDE, SODIUM LACTATE AND CALCIUM CHLORIDE 9 ML/HR: 600; 310; 30; 20 INJECTION, SOLUTION INTRAVENOUS at 11:36

## 2020-05-16 RX ADMIN — FENTANYL CITRATE 50 MCG: 50 INJECTION INTRAMUSCULAR; INTRAVENOUS at 13:39

## 2020-05-16 RX ADMIN — SODIUM CHLORIDE, POTASSIUM CHLORIDE, SODIUM LACTATE AND CALCIUM CHLORIDE: 600; 310; 30; 20 INJECTION, SOLUTION INTRAVENOUS at 13:43

## 2020-05-16 RX ADMIN — ROCURONIUM BROMIDE 20 MG: 10 INJECTION, SOLUTION INTRAVENOUS at 13:41

## 2020-05-16 RX ADMIN — HYDROMORPHONE HYDROCHLORIDE 0.5 MG: 2 INJECTION, SOLUTION INTRAMUSCULAR; INTRAVENOUS; SUBCUTANEOUS at 14:31

## 2020-05-16 RX ADMIN — PROPOFOL 150 MG: 10 INJECTION, EMULSION INTRAVENOUS at 11:46

## 2020-05-16 RX ADMIN — SODIUM CHLORIDE, PRESERVATIVE FREE 10 ML: 5 INJECTION INTRAVENOUS at 23:49

## 2020-05-16 RX ADMIN — HYDROMORPHONE HYDROCHLORIDE 0.5 MG: 2 INJECTION, SOLUTION INTRAMUSCULAR; INTRAVENOUS; SUBCUTANEOUS at 14:23

## 2020-05-16 RX ADMIN — OXYTOCIN 10 ML: 10 INJECTION, SOLUTION INTRAMUSCULAR; INTRAVENOUS at 12:18

## 2020-05-16 RX ADMIN — LIDOCAINE HYDROCHLORIDE 100 MG: 20 INJECTION, SOLUTION INFILTRATION; PERINEURAL at 11:44

## 2020-05-16 RX ADMIN — NIFEDIPINE 60 MG: 60 TABLET, FILM COATED, EXTENDED RELEASE ORAL at 18:51

## 2020-05-16 RX ADMIN — MORPHINE SULFATE 4 MG: 2 INJECTION, SOLUTION INTRAMUSCULAR; INTRAVENOUS at 08:37

## 2020-05-16 RX ADMIN — FENTANYL CITRATE 50 MCG: 50 INJECTION INTRAMUSCULAR; INTRAVENOUS at 14:09

## 2020-05-16 RX ADMIN — SUCCINYLCHOLINE CHLORIDE 140 MG: 20 INJECTION, SOLUTION INTRAMUSCULAR; INTRAVENOUS; PARENTERAL at 11:46

## 2020-05-16 RX ADMIN — SODIUM CHLORIDE: 9 INJECTION, SOLUTION INTRAVENOUS at 11:39

## 2020-05-16 RX ADMIN — LIDOCAINE HYDROCHLORIDE 100 MG: 20 INJECTION, SOLUTION INFILTRATION; PERINEURAL at 11:46

## 2020-05-16 RX ADMIN — LEVOTHYROXINE SODIUM 125 MCG: 125 TABLET ORAL at 18:51

## 2020-05-16 RX ADMIN — FAMOTIDINE 20 MG: 10 INJECTION, SOLUTION INTRAVENOUS at 11:36

## 2020-05-16 RX ADMIN — OXYTOCIN 999 ML/HR: 10 INJECTION, SOLUTION INTRAMUSCULAR; INTRAVENOUS at 12:11

## 2020-05-16 RX ADMIN — SODIUM CHLORIDE 1000 ML: 9 INJECTION, SOLUTION INTRAVENOUS at 09:47

## 2020-05-16 RX ADMIN — ROCURONIUM BROMIDE 30 MG: 10 INJECTION, SOLUTION INTRAVENOUS at 12:15

## 2020-05-16 RX ADMIN — CARBOPROST TROMETHAMINE 250 MCG: 250 INJECTION, SOLUTION INTRAMUSCULAR at 12:11

## 2020-05-16 RX ADMIN — OXYTOCIN 500 ML/HR: 10 INJECTION, SOLUTION INTRAMUSCULAR; INTRAVENOUS at 12:39

## 2020-05-16 NOTE — ED NOTES
Pt reports she had a  3 days ago, reports vaginal bleeding and vomiting that began this morning. Pt denies cough, fever or SOB. Pt arrives to ED in mask, triage staff wearing masks during first look.     Toshia Prabhakar, RN  20 1806

## 2020-05-16 NOTE — H&P
Patient Care Team:  Rene Campuzano as PCP - General (Internal Medicine)    Chief complaint vaginal bleeding    Subjective     Patient is a 40 y.o. female -1-1-2 who is 10 days status post primary  at 36 weeks and 6 days for twin gestation, and chronic hypertension with superimposed preeclampsia.  Patient had no severe features of preeclampsia.  Patient was discharged home on postoperative day #3 with a hemoglobin of 7.2.  She did have a postpartum hemorrhage in PACU after  due to uterine atony.  Her bleeding responded well to uterotonic's.  Patient reports she had been doing well at home until this morning when she woke up with abdominal cramping and began having heavy, bright red bleeding.  Patient presented to the emergency department where her hemoglobin was noted to be 5.4.  Patient also reports having one episode of vomiting at home.  Patient denies any headaches, vision changes, right upper quadrant pain.  She is on Procardia once daily and did not take her morning dose.  Her initial blood pressure was severely elevated in the emergency department, but repeat was in the mild range.    Review of Systems   Pertinent items are noted in HPI, all other systems reviewed and negative    History  Past Medical History:   Diagnosis Date   • Disease of thyroid gland    • Hepatitis    • Hypertension      Past Surgical History:   Procedure Laterality Date   • BREAST AUGMENTATION     •  SECTION N/A 2020    Procedure:  SECTION PRIMARY;  Surgeon: Chad Baum MD;  Location: [facility] LABOR DELIVERY;  Service: Obstetrics/Gynecology;  Laterality: N/A;   • OVARIAN CYST SURGERY       Family History   Problem Relation Age of Onset   • Ovarian cancer Maternal Aunt    • Birth defects Neg Hx      Social History     Tobacco Use   • Smoking status: Former Smoker   • Smokeless tobacco: Never Used   Substance Use Topics   • Alcohol use: No     Frequency: Never   • Drug use: Never       (Not  in a hospital admission)  Allergies:  Patient has no known allergies.    Objective     Vital Signs  Temp:  [98.6 °F (37 °C)] 98.6 °F (37 °C)  Heart Rate:  [68-72] 71  Resp:  [16-18] 16  BP: (152-176)/(89-92) 152/89    Physical Exam:    General Appearance: alert, appears stated age and cooperative  Lungs: clear to auscultation, respirations regular, respirations even and respirations unlabored  Heart: regular rhythm & normal rate  Abdomen: Uterus is enlarged and above the umbilicus.  Extremities: moves extremities well, no cyanosis, no redness and 1+ edema  Neurologic: Reflexes 2+ reflexes with no clonus    Results Review:    I reviewed the patient's new clinical results.    Assessment/Plan       Delayed postpartum hemorrhage    Postpartum hemorrhage      40-year-old  who is 10 days status post primary  who presents with heavy vaginal bleeding and associated anemia  --Chronic hypertension    1.  Vaginal bleeding--ultrasound shows a markedly enlarged uterus with retained products of conception.  Reviewed ultrasound findings with the patient and recommend proceeding with suction D&C.  Risks of procedure were discussed with her including risk for infection, bleeding, uterine perforation with damage to surrounding structures, need for additional surgery if injury occurs, risk of continued bleeding and need for hysterectomy, risk of anesthesia, blood clots, heart attack, and stroke.  Patient understands all risks and agrees to proceed.  We will transfuse 2 units of packed red blood cells.    2.  Chronic hypertension--patient's initial blood pressure was in the severe range, but repeat is in the mild range.  She has not had her Procardia today.  Her creatinine is noted to be 1.2, but patient is asymptomatic for preeclampsia, and this may be related to her being hypovolemic.  We will repeat labs after patient's procedure and if there is any suspicion for severe features of preeclampsia, we will start  magnesium sulfate.    I discussed the patients findings and my recommendations with patient.     Shivani Simon MD  05/16/20  10:21

## 2020-05-16 NOTE — ANESTHESIA POSTPROCEDURE EVALUATION
"Patient: Stephanie Rodriguez    Procedure Summary     Date:  05/16/20 Room / Location:  [facility] OR 06 / [facility] MAIN OR    Anesthesia Start:  1139 Anesthesia Stop:  1436    Procedure:  DILATATION AND CURETTAGE WITH SUCTION converted to open hysterectomy and right salpingectomy (N/A Vagina) Diagnosis:       Delayed postpartum hemorrhage      (Delayed postpartum hemorrhage [O72.2])    Surgeon:  Shivani Simon MD Provider:  Samir Ryder MD    Anesthesia Type:  general ASA Status:  3 - Emergent          Anesthesia Type: general    Vitals  Vitals Value Taken Time   /73 5/16/2020  3:05 PM   Temp 36.6 °C (97.8 °F) 5/16/2020  3:00 PM   Pulse 78 5/16/2020  3:15 PM   Resp 20 5/16/2020  3:00 PM   SpO2 99 % 5/16/2020  3:15 PM   Vitals shown include unvalidated device data.        Post Anesthesia Care and Evaluation    Patient location during evaluation: bedside  Patient participation: complete - patient participated  Level of consciousness: sleepy but conscious  Pain score: 0  Pain management: adequate  Airway patency: patent  Anesthetic complications: No anesthetic complications    Cardiovascular status: acceptable  Respiratory status: acceptable  Hydration status: acceptable    Comments: /88   Pulse 80   Temp 37.1 °C (98.8 °F) (Oral)   Resp 20   Ht 162.6 cm (64\")   Wt 81.2 kg (179 lb 0.2 oz)   LMP 08/02/2019 (Exact Date)   SpO2 96%   Breastfeeding Yes   BMI 30.73 kg/m²         "

## 2020-05-16 NOTE — PROGRESS NOTES
Assessment/Plan   Assessment & Plan    Subjective   Subjective  Temp:  [97.8 °F (36.6 °C)-98.9 °F (37.2 °C)] 98.8 °F (37.1 °C)  Heart Rate:  [68-96] 80  Resp:  [16-20] 20  BP: (121-199)/() 121/77  @XQWESS1XQHEFN()@  @IOTHISSHIFT()@    Objective   Objective

## 2020-05-16 NOTE — PLAN OF CARE
VSS. Checking hemoglobin q6 hours. Lactation nurse at bedside, q3hr pumping. Pain meds x1.       Problem: Patient Care Overview  Goal: Plan of Care Review  Outcome: Ongoing (interventions implemented as appropriate)  Goal: Individualization and Mutuality  Outcome: Ongoing (interventions implemented as appropriate)  Goal: Discharge Needs Assessment  Outcome: Ongoing (interventions implemented as appropriate)  Goal: Interprofessional Rounds/Family Conf  Outcome: Ongoing (interventions implemented as appropriate)     Problem: Skin Injury Risk (Adult)  Goal: Identify Related Risk Factors and Signs and Symptoms  Outcome: Ongoing (interventions implemented as appropriate)  Goal: Skin Health and Integrity  Outcome: Ongoing (interventions implemented as appropriate)     Problem: Postpartum ( Delivery) (Adult,Obstetrics,Pediatric)  Goal: Signs and Symptoms of Listed Potential Problems Will be Absent, Minimized or Managed (Postpartum)  Outcome: Ongoing (interventions implemented as appropriate)  Goal: Anesthesia/Sedation Recovery  Outcome: Ongoing (interventions implemented as appropriate)     Problem: Pain, Acute (Adult)  Goal: Identify Related Risk Factors and Signs and Symptoms  Outcome: Ongoing (interventions implemented as appropriate)  Goal: Acceptable Pain Control/Comfort Level  Outcome: Ongoing (interventions implemented as appropriate)

## 2020-05-16 NOTE — ED PROVIDER NOTES
EMERGENCY DEPARTMENT ENCOUNTER    Room Number:    Date of encounter:  2020  PCP: Rene Campuzano  Historian: Patient      HPI:  Chief Complaint: Abdominal pain and vaginal bleeding      Context: Stephanie Rodriguez is a 40 y.o. female who presents to the ED c/o acute onset of lower abdominal pain with vomiting and increased vaginal bleeding for the past 5 hours.  The patient had a  10 days ago with twins and was discharged 3 days later with a hemoglobin of 7.2.  She states she has been doing well since that time with just some mild dark vaginal bleeding.  However this morning had an acute onset of lower abdominal pain with vomiting and then heavy bright red bleeding.  Patient denies headache, vision changes, sore throat, chest pain, shortness of breath, fever, chills, cough, COVID exposure or neuro deficits      PAST MEDICAL HISTORY  Active Ambulatory Problems     Diagnosis Date Noted   • Twin pregnancy, twins dichorionic and diamniotic 2019   • AMA (advanced maternal age) primigravida 35+ 2020   • Chronic hypertension affecting pregnancy 2020   • Breech presentation, antepartum, fetus 2 2020   • S/P  section 2020   • Delayed postpartum hemorrhage 2020     Resolved Ambulatory Problems     Diagnosis Date Noted   • No Resolved Ambulatory Problems     Past Medical History:   Diagnosis Date   • Disease of thyroid gland    • Hepatitis    • Hypertension          PAST SURGICAL HISTORY  Past Surgical History:   Procedure Laterality Date   • BREAST AUGMENTATION     •  SECTION N/A 2020    Procedure:  SECTION PRIMARY;  Surgeon: Chad Baum MD;  Location: [facility] LABOR DELIVERY;  Service: Obstetrics/Gynecology;  Laterality: N/A;   • OVARIAN CYST SURGERY           FAMILY HISTORY  Family History   Problem Relation Age of Onset   • Ovarian cancer Maternal Aunt    • Birth defects Neg Hx          SOCIAL HISTORY  Social History      Socioeconomic History   • Marital status: Single     Spouse name: Not on file   • Number of children: Not on file   • Years of education: Not on file   • Highest education level: Not on file   Tobacco Use   • Smoking status: Former Smoker   • Smokeless tobacco: Never Used   Substance and Sexual Activity   • Alcohol use: No     Frequency: Never   • Drug use: Never   • Sexual activity: Yes     Partners: Male         ALLERGIES  Patient has no known allergies.        REVIEW OF SYSTEMS  Review of Systems         All systems reviewed and negative except for those discussed in HPI.     PHYSICAL EXAM    I have reviewed the triage vital signs and nursing notes.    ED Triage Vitals   Temp Heart Rate Resp BP SpO2   05/16/20 0748 05/16/20 0748 05/16/20 0748 05/16/20 0801 05/16/20 0748   98.6 °F (37 °C) 68 18 176/92 95 %      Temp src Heart Rate Source Patient Position BP Location FiO2 (%)   05/16/20 0748 05/16/20 0801 05/16/20 0801 05/16/20 0801 --   Tympanic Monitor Lying Right arm        GENERAL: 40-year-old well developed, well nourished in no acute distress  HENT: NCAT, neck supple, trachea midline  EYES: no scleral icterus, PERRL, pale conjunctiva  CV: regular rhythm, regular rate, no murmur  RESPIRATORY: unlabored effort, CTAB  ABDOMEN: soft, lower abdominal tenderness with guarding but no rebound, bowel sounds present  MUSCULOSKELETAL: no gross deformity, no pedal edema, no calf tenderness  NEURO: alert,  sensory and motor function of extremities grossly intact, speech clear, mental status normal/baseline  SKIN: warm, dry, no rash  PSYCH:  Appropriate mood and affect  Pelvic exam: Patient has moderate amount of bright red blood in the vaginal vault.  I see no lacerations, but she does have a slightly enlarged and boggy uterus with mild tenderness on exam    PPE  Pt does not present with symptoms for COVID19; however, I was wearing a mask throughout all patient interaction.      Vital signs and nursing notes  reviewed.      LAB RESULTS  Recent Results (from the past 24 hour(s))   Protime-INR    Collection Time: 05/16/20  8:11 AM   Result Value Ref Range    Protime 20.7 (H) 11.7 - 14.2 Seconds    INR 1.81 (H) 0.90 - 1.10   Comprehensive Metabolic Panel    Collection Time: 05/16/20  8:12 AM   Result Value Ref Range    Glucose 108 (H) 65 - 99 mg/dL    BUN 26 (H) 6 - 20 mg/dL    Creatinine 1.27 (H) 0.57 - 1.00 mg/dL    Sodium 137 136 - 145 mmol/L    Potassium 4.2 3.5 - 5.2 mmol/L    Chloride 104 98 - 107 mmol/L    CO2 22.5 22.0 - 29.0 mmol/L    Calcium 8.0 (L) 8.6 - 10.5 mg/dL    Total Protein 5.7 (L) 6.0 - 8.5 g/dL    Albumin 3.30 (L) 3.50 - 5.20 g/dL    ALT (SGPT) 20 1 - 33 U/L    AST (SGOT) 35 (H) 1 - 32 U/L    Alkaline Phosphatase 128 (H) 39 - 117 U/L    Total Bilirubin 0.8 0.2 - 1.2 mg/dL    eGFR Non African Amer 46 (L) >60 mL/min/1.73    Globulin 2.4 gm/dL    A/G Ratio 1.4 g/dL    BUN/Creatinine Ratio 20.5 7.0 - 25.0    Anion Gap 10.5 5.0 - 15.0 mmol/L   Lipase    Collection Time: 05/16/20  8:12 AM   Result Value Ref Range    Lipase 22 13 - 60 U/L   CBC Auto Differential    Collection Time: 05/16/20  8:13 AM   Result Value Ref Range    WBC 13.91 (H) 3.40 - 10.80 10*3/mm3    RBC 1.68 (L) 3.77 - 5.28 10*6/mm3    Hemoglobin 5.4 (C) 12.0 - 15.9 g/dL    Hematocrit 16.0 (C) 34.0 - 46.6 %    MCV 95.2 79.0 - 97.0 fL    MCH 32.1 26.6 - 33.0 pg    MCHC 33.8 31.5 - 35.7 g/dL    RDW 14.3 12.3 - 15.4 %    RDW-SD 48.6 37.0 - 54.0 fl    MPV 8.7 6.0 - 12.0 fL    Platelets 182 140 - 450 10*3/mm3    Neutrophil % 89.7 (H) 42.7 - 76.0 %    Lymphocyte % 2.4 (L) 19.6 - 45.3 %    Monocyte % 6.5 5.0 - 12.0 %    Eosinophil % 0.1 (L) 0.3 - 6.2 %    Basophil % 0.1 0.0 - 1.5 %    Immature Grans % 1.2 (H) 0.0 - 0.5 %    Neutrophils, Absolute 12.49 (H) 1.70 - 7.00 10*3/mm3    Lymphocytes, Absolute 0.33 (L) 0.70 - 3.10 10*3/mm3    Monocytes, Absolute 0.90 0.10 - 0.90 10*3/mm3    Eosinophils, Absolute 0.01 0.00 - 0.40 10*3/mm3    Basophils, Absolute  0.02 0.00 - 0.20 10*3/mm3    Immature Grans, Absolute 0.16 (H) 0.00 - 0.05 10*3/mm3    nRBC 0.1 0.0 - 0.2 /100 WBC   Urinalysis With Microscopic If Indicated (No Culture) - Urine, Clean Catch    Collection Time: 20  8:31 AM   Result Value Ref Range    Color, UA Red (A) Yellow, Straw    Appearance, UA Cloudy (A) Clear    pH, UA 6.0 5.0 - 8.0    Specific Gravity, UA 1.026 1.005 - 1.030    Glucose,  mg/dL (Trace) (A) Negative    Ketones, UA Negative Negative    Bilirubin, UA Negative Negative    Blood, UA Large (3+) (A) Negative    Protein, UA >=300 mg/dL (3+) (A) Negative    Leuk Esterase, UA Small (1+) (A) Negative    Nitrite, UA Positive (A) Negative    Urobilinogen, UA 0.2 E.U./dL 0.2 - 1.0 E.U./dL   Urinalysis, Microscopic Only - Urine, Clean Catch    Collection Time: 20  8:31 AM   Result Value Ref Range    RBC, UA Too Numerous to Count (A) None Seen, 0-2 /HPF    WBC, UA Unable to determine due to loaded field (A) None Seen, 0-2 /HPF    Bacteria, UA Unable to determine due to loaded field (A) None Seen /HPF    Squamous Epithelial Cells, UA Unable to determine due to loaded field (A) None Seen, 0-2 /HPF    Hyaline Casts, UA Unable to determine due to loaded field None Seen /LPF    Methodology Manual Light Microscopy    Type & Screen    Collection Time: 20  8:47 AM   Result Value Ref Range    ABO Type O     RH type Negative        Ordered the above labs and independently reviewed the results.        RADIOLOGY  Us Pelvis Complete    Result Date: 2020  PELVIC ULTRASOUND  HISTORY: Recent  on 2020. Now presents with sudden onset of severe vaginal bleeding. Abdominal pain.  Examination was performed as an emergency procedure. The uterus is quite enlarged, measuring 12.4 x 15.3 x 21.8 cm. There is a very heterogeneous appearance of the uterus and it is difficult to visualize normal myometrium. I suspect this relates to retained placenta and associated heterogeneous thrombus  within the intrauterine cavity and extending into the lower cervical segment. There is no complex adnexal mass or free fluid.  CONCLUSION: Marked enlargement of the uterus with heterogeneous contents likely related to retained placenta and associated thrombus extending into the cervix. Gynecologic surgical consult is recommended. These findings were communicated directly to Dr. Matute in the emergency room.  This report was finalized on 5/16/2020 10:33 AM by Dr. Joni Brown M.D.        I ordered the above noted radiological studies. Independently reviewed by me and discussed with radiologist.  See dictation above for official radiology interpretation.      PROCEDURES    Critical Care  Performed by: Burak Matute MD  Authorized by: Burak Matute MD     Critical care provider statement:     Critical care time (minutes):  35    Critical care was necessary to treat or prevent imminent or life-threatening deterioration of the following conditions:  Circulatory failure (Acute blood loss anemia)    Critical care was time spent personally by me on the following activities:  Development of treatment plan with patient or surrogate, discussions with consultants, discussions with primary provider, evaluation of patient's response to treatment, examination of patient, obtaining history from patient or surrogate, ordering and review of laboratory studies, ordering and performing treatments and interventions, ordering and review of radiographic studies, pulse oximetry, re-evaluation of patient's condition and review of old charts    I assumed direction of critical care for this patient from another provider in my specialty: no              MEDICATIONS GIVEN IN ER    Medications   labetalol (NORMODYNE,TRANDATE) injection 20 mg (20 mg Intravenous Not Given 5/16/20 9404)   sodium chloride 0.9 % bolus 1,000 mL (1,000 mL Intravenous New Bag 5/16/20 6561)   morphine injection 4 mg (4 mg Intravenous Given 5/16/20 3213)    ondansetron (ZOFRAN) injection 4 mg (4 mg Intravenous Given 5/16/20 0835)         PROGRESS, DATA ANALYSIS, CONSULTS, AND MEDICAL DECISION MAKING    All labs have been independently reviewed by me.  All radiology studies have been reviewed by me and discussed with radiologist dictating report.   EKG's independently reviewed by me.  Discussion below represents my analysis of pertinent findings related to patient's condition, differential diagnosis, treatment plan and final disposition.      ED Course as of May 16 1040   Sat May 16, 2020   0819 The patient's blood pressure is 176/92.  I discussed this with the patient.  She has no headache, visual changes or increased pedal edema.  She states she has a history of hypertension has not had her blood pressure medicine today.     [GP]   0835 The patient's hemoglobin today is 5.4 down from 7.2 9 days ago.  I will order her packed red blood cell transfusion.    [GP]   0855 Pelvic exam reveals moderate bright red vaginal blood and boggy slightly tender uterus.  I have ordered a blood transfusion and have a call out to the patient's OB.    [GP]   0906 I spoke with Dr. Lockett who is on for her OB.  She is aware that the patient has dropped her hemoglobin nearly 2 grams in the last 9 days and that she has an INR of 1.8.  She is aware that we are currently waiting for the patient's ultrasound and asked me to give her a call back after the ultrasound.  She feels the patient will likely need D&C, thus I will order a screening COVID test on the patient.    [GP]   0938 I discussed the ultrasound with Dr. Garcia from radiology.  He states he is having difficulty seeing normal endometrium.  He believes there is retained placenta and products of conception and a significant amount of blood clots.  I have now re-paged Dr. Lockett.    [GP]   0939 The patient is low risk for covid but I have ordered the covid screening exam prior to going to the OR per instructions    [GP]   0944 I  discussed the ultrasound report with Dr. Lockett and she will come to see the patient in the emergency department.    [GP]   1010 Dr. Lockett in the emergency department and is taking the patient to the operating room    [GP]      ED Course User Index  [GP] Burak Matute MD           The differential diagnosis includes but is not limited to postop hemorrhage, vaginal laceration, postop infection or uterine involution        AS OF 10:40 VITALS:    BP - 152/89  HR - 71  TEMP - 98.6 °F (37 °C) (Tympanic)  02 SATS - 98%        DIAGNOSIS  Final diagnoses:   Postpartum hemorrhage, unspecified type   Acute blood loss anemia   Hypertension, unspecified type   Elevated INR         DISPOSITION  ADMISSION    Discussed treatment plan and reason for admission with pt/family and admitting physician.  Pt/family voiced understanding of the plan for admission for further testing/treatment as needed.        EMR Dragon/Transcription disclaimer:   Much of this encounter note is an electronic transcription/translation of spoken language to printed text. The electronic translation of spoken language may permit erroneous, or at times, nonsensical words or phrases to be inadvertently transcribed; Although I have reviewed the note for such errors, some may still exist.       No scribe was used     Burak Matute MD  05/16/20 0988

## 2020-05-16 NOTE — CONSULTS
Referring Provider: Dr. Simon  Reason for Consultation: ICU care    Patient Care Team:  Rene Campuzano as PCP - General (Internal Medicine)    Chief complaint:   Vaginal bleeding    History of present illness:    Subjective   40-year-old  who is 10 days status post primary .  She had a healthy boy.  She woke up this morning around 3 AM with abdominal cramping and heavy vaginal bleeding.  Associated symptoms include vomiting x1 only.  She presented to the ED and her initial hemoglobin was 5.4 her hemoglobin  She left the hospital on 2020 was 7.2..  However, she was hypertensive.  She continued to experience bleeding over here and she was taken emergently for hysterectomy which was uneventful.  I saw her in the intensive care unit postoperatively.  She was somnolent after surgery and history was somewhat limited.  She appears to be stable hemodynamically with good blood pressure and heart rate.  I reviewed the operative report and the labs.  She has received 6 units PRBC, 2 units platelets and 4 units FFP.      Labs reviewed:  Hemoglobin on admission 5.6.  Latest hemoglobin 7.4 at 1440.  Platelet on admission 182 and repeat platelets at noon was 135.  INR on admission 1.8 and repeat was 1.9.  Latest was 1.3 at 1440    Review of Systems  Constitutional: No fever or chills.   ENMT: No sinus congestion or postnasal drip  Cardiovascular: No chest pain, palpitation or legs swelling.    Respiratory: No dyspnea, cough or wheezing.  Gastrointestinal: No constipation, diarrhea but mild abdominal discomfort pain in the pelvic area.  No nausea or vomiting.  Neurology: No headache, weakness, numbness or dizziness.   Musculoskeletal: No joints pain, stiffness or swelling.   Psychiatry: No depression.  Genitourinary: No dysuria or frequent urination  Endo: No weight changes. No cold or warm intolerance.  Lymphatic: No swollen glands.  Integumentary: No rash.    History  Past Medical History:      Diagnosis Date   • Disease of thyroid gland    • Hepatitis    • Hypertension    ,   Past Surgical History:   Procedure Laterality Date   • BREAST AUGMENTATION     •  SECTION N/A 2020    Procedure:  SECTION PRIMARY;  Surgeon: Chad Baum MD;  Location: [facility] LABOR DELIVERY;  Service: Obstetrics/Gynecology;  Laterality: N/A;   • OVARIAN CYST SURGERY     ,   Family History   Problem Relation Age of Onset   • Ovarian cancer Maternal Aunt    • Birth defects Neg Hx    ,   Social History     Tobacco Use   • Smoking status: Never Smoker   • Smokeless tobacco: Never Used   Substance Use Topics   • Alcohol use: No     Frequency: Never   • Drug use: Never   ,   Medications Prior to Admission   Medication Sig Dispense Refill Last Dose   • albuterol sulfate  (90 Base) MCG/ACT inhaler Inhale 2 puffs Every 6 (Six) Hours As Needed for Shortness of Air. 1 inhaler 0 Taking   • ferrous sulfate 325 (65 FE) MG tablet Take 1 tablet by mouth Daily With Breakfast. 30 tablet 2    • fluticasone (FLONASE) 50 MCG/ACT nasal spray SHAKE LQ AND U 1 SPR IEN D      • folic acid (FOLVITE) 1 MG tablet Take 1,000 mcg by mouth Daily.  5 Taking   • ibuprofen (ADVIL,MOTRIN) 600 MG tablet Take 1 tablet by mouth Every 8 (Eight) Hours As Needed for Mild Pain . 50 tablet 3    • levothyroxine (SYNTHROID, LEVOTHROID) 125 MCG tablet Take 1 tablet by mouth Daily. 30 tablet 0 2020 at Unknown time   • loratadine (CLARITIN) 10 MG tablet Take 10 mg by mouth Daily.   Taking   • mometasone (NASONEX) 50 MCG/ACT nasal spray 2 sprays into the nostril(s) as directed by provider Daily.      • NIFEdipine CC (ADALAT CC) 60 MG 24 hr tablet Take 60 mg by mouth Daily.  0 Taking   • oxyCODONE-acetaminophen (PERCOCET) 5-325 MG per tablet Take 2 tablets by mouth Every 6 (Six) Hours As Needed for Moderate Pain . 30 tablet 0    , Scheduled Meds:    levothyroxine 125 mcg Oral Daily   NIFEdipine CC 60 mg Oral Daily   sodium chloride 10 mL  Intravenous Q12H    and Allergies:  Patient has no known allergies.    Objective     Vital Signs   Temp:  [97.8 °F (36.6 °C)-98.9 °F (37.2 °C)] 98.8 °F (37.1 °C)  Heart Rate:  [68-96] 96  Resp:  [16-20] 20  BP: (147-199)/() 156/73    Physical Exam:  Constitutional: Not in acute distress.  Eyes: Injected conjunctivae, EOMI. pupils equal reactive to light.  ENMT: Valles 3. No oral thrush. Tonsils grade  Neck: Trachea midline. No thyromegaly  Heart: RRR, no murmur  Lungs: Good and equal air entry bilaterally.  Non labored breathing.   Abdomen: Distended. Soft.  Mild lower tenderness but no guarding or rebound.  No dullness. No HSM.  Extremities: No cyanosis, clubbing or pitting edema.  Warm extremities and well-perfused.  Neuro: Somnolent, alert, oriented x3.  Strength 5/5 in arms and legs.  Psych: Appropriate mood and affect.    Integumentary: No rash.  Normal skin turgor  Lymphatic: No palpable cervical or supraclavicular lymph nodes.      Diagnostic imaging:  I personally and independently reviewed the following images:           Assessment   1. Delayed massive postpartum hemorrhage  2. Acute blood loss anemia  3. Postpartum day 10  4. Hypothyroidism  5. Hypertension  6. Mild hyperkalemia  7. TAMMIE  8. Mild anion gap metabolic acidosis, secondary to TAMMIE versus lactic acidosis due to hypoperfusion from acute anemia  9. Leukocytosis, likely stress-induced    Recommendations:    · Admit to ICU.  Monitor closely in the setting of massive hemorrhage requiring massive transfusion.  · H&H every 6.  Repeat INR as well and platelets.  If further bleeding recurred or further transfusion required then will match 1x1x1 per massive transfusion protocol  · Administer 1 dose of Rhogam per blood bank recommendation  · Resume her levothyroxine at 125 mg daily  · Nifedipine for hypertension  · Repeat urinalysis  · IV hydration with normal saline due to TAMMIE  · Check lactic acid  · Ensure 2 large-bore IV to administer massive  transfusion if needed  · DVT prophylaxis with SCD          Page Flores MD  05/16/20  16:10    Time: Critical care 35 min

## 2020-05-16 NOTE — OP NOTE
Procedure: Suction dilation and curettage, supracervical abdominal hysterectomy and right salpingectomy    Surgeon: Shivani Simon MD    Assistant: Chad Baum MD    Preop diagnosis: 1.  Postpartum day 10 status post primary   2.  Delayed postpartum hemorrhage  3.  Uterine atony    Postop diagnosis: Same    Indications: Patient is a 40-year-old female who presented 10 days status post primary  at 36 weeks for twin gestation.  Her initial  was complicated by uterine atony in the recovery room.  Patient presented to the emergency department on post partum day 10 after experiencing heavy vaginal bleeding and abdominal pain.  Her hemoglobin was noted to be 5 and ultrasound showed a markedly enlarged uterus with possible retained products.  Patient was counseled and recommended suction D&C.  She was counseled on risks of procedure including risk for hysterectomy if bleeding could not be stopped.  Once a suction D&C was began in the operating room, patient had copious amounts of blood loss and uterine atony.  Decision was ultimately made for hysterectomy.    Findings: Enlarged uterus with atony.  Normal right tube and ovary.  Surgically absent left tube and ovary    Anesthesia: GETA    EBL: 3 Liters    Pathology: Uterus and right fallopian tube    Complications: Hemorrhage    Blood products: Patient received 6 units of packed red blood cells, 2 units of platelets, and 4 units of fresh frozen plasma intraoperatively    Procedure: Patient was taken to the operating room and placed under general anesthesia without difficulty.  She was placed in the dorsal lithotomy position and prepped and draped in the normal sterile fashion.  Timeout was performed and patient and procedure were verified.  200 mg of IV doxycycline was given preoperatively.  A bivalve speculum was placed in the patient's vagina and the cervix was grasped with a long Allis.  Cervix did not require dilation and a 9 mm suction curette  was advanced into the uterus.  Upon entry with a suction curette, patient began having very heavy bright red bleeding and a large amount of blood clot was removed.  Multiple passes were made with a suction curette and bleeding remained very heavy and brisk.  Orders were given to give a dose of Hemabate and tranexamic acid.  Patient was also receiving pitocin.  Vigorous bimanual massage was performed with no improvement in the uterine tone.  At this point, estimated blood loss was approximately 1.5 L.  Bleeding remained extremely heavy and decision was made for possible hysterectomy.  Patient was placed in the dorsal supine position and a Joseph catheter was placed.  She was reprepped and redraped in a normal sterile fashion.  Patient received cefazolin 2 grams IV prior to surgery for antibiotic prophylaxis.  She was wearing SCDs.  The previous Pfannenstiel incision was opened with a knife and taken through the subcutaneous tissue to the fascia with the Bovie. The fascia scored in the midline and the incision was extended laterally with curved Crowe scissors. The superior rectus fascia was grasped with Kocher clamps times two and divided off the underlying rectus muscles. This was repeated on the inferior aspect. The rectus muscles were then  in the midline and the parietal peritoneum was entered digitally.   The uterus was elevated and delivered through the abdominal incision.  Patient's uterus remained extremely boggy and decision was made to proceed with hysterectomy.  Inspection of the pelvic cavity revealed the findings as noted above.  The left round ligament was doubly ligated with 0 Vicryl, and the broad ligament was divided into anterior and posterior leaflets.  Tube and ovary on the left side had been surgically removed in the past.  The anterior leaflet of the broad ligament was then further dissected with the Metzenbaums and the bladder flap was dissected off inferiorly.  The uterine arteries were  skeletonized.  The uterine artery was then doubly clamped, transected, and ligated with 0 Vicryl.  This procedure was repeated in a similar fashion on the contralateral side.  A clamp was placed under the right fallopian tube and suspensory ligament to remove the right fallopian tube.  The distal stump was doubly ligated with 0 Vicryl.  After assuring that the bladder was well out of the way of the operative field, clamps were placed across the cervix at a level just above the uterosacral ligaments.  The uterus was removed above the clamps.  The remaining cervical stump was then suture ligated with figure-of-eight stitches of 0 Vicryl and the peritoneum was incorporated into the stitches.  The uterus and right fallopian tube were sent to pathology.  Patient's abdomen was then irrigated and surgical area was visualized.  Good hemostasis was noted from the cuff and all the pedicles.  Mia was placed over the surgical bed.  Lap counts were correct at this point.  An intraoperative x-ray was also performed due to a count not being performed before the surgery and was negative.  The peritoneum was then reapproximated with 3-0 Vicryl in a running fashion.  The rectus fascia was then closed with 0 Vicryl in a running fashion.  Subcutaneous tissue was irrigated and reapproximated with 3-0 Vicryl.  The skin was closed with 4-0 Monocryl in a subcuticular fashion.  Dermabond was applied.  Counts for needles, sponges, laps and instruments were correct times two at the end of the procedure. I was present and scrubbed for the entire procedure. There were no major complications. The patient was transported to the recovery area in stable condition.  Joseph catheter was draining clear urine at the end of the procedure.  Details of surgery were called and explained to the patient's cousin.  I discussed intraoperative findings and reason for hysterectomy with patient in the PACU>

## 2020-05-16 NOTE — ANESTHESIA PREPROCEDURE EVALUATION
Anesthesia Evaluation     Patient summary reviewed and Nursing notes reviewed   NPO Solid Status: > 8 hours  NPO Liquid Status: > 4 hours           Airway   Mallampati: II  Neck ROM: full  No difficulty expected  Dental - normal exam     Pulmonary     breath sounds clear to auscultation  (+) a smoker Former,   Cardiovascular     Rhythm: regular    (+) hypertension,       Neuro/Psych  GI/Hepatic/Renal/Endo    (+) obesity,   hepatitis, liver disease,     Musculoskeletal     Abdominal   (+) obese,    Substance History      OB/GYN    (+) Pregnant, pregnancy induced hypertension    Comment: csection last week       Other                        Anesthesia Plan    ASA 3 - emergent     general   (RSI, Hb 5)  intravenous induction     Anesthetic plan, all risks, benefits, and alternatives have been provided, discussed and informed consent has been obtained with: patient.

## 2020-05-17 LAB
ANION GAP SERPL CALCULATED.3IONS-SCNC: 9.8 MMOL/L (ref 5–15)
BH BB BLOOD EXPIRATION DATE: NORMAL
BH BB BLOOD TYPE BARCODE: 5100
BH BB BLOOD TYPE BARCODE: 6200
BH BB BLOOD TYPE BARCODE: 6200
BH BB BLOOD TYPE BARCODE: 9500
BH BB DISPENSE STATUS: NORMAL
BH BB PRODUCT CODE: NORMAL
BH BB UNIT NUMBER: NORMAL
BILIRUB CONJ SERPL-MCNC: <0.2 MG/DL (ref 0.2–0.3)
BILIRUB INDIRECT SERPL-MCNC: ABNORMAL MG/DL
BILIRUB SERPL-MCNC: 0.3 MG/DL (ref 0.2–1.2)
BUN BLD-MCNC: 26 MG/DL (ref 6–20)
BUN/CREAT SERPL: 13.9 (ref 7–25)
CALCIUM SPEC-SCNC: 6.2 MG/DL (ref 8.6–10.5)
CHLORIDE SERPL-SCNC: 103 MMOL/L (ref 98–107)
CK SERPL-CCNC: 1441 U/L (ref 20–180)
CO2 SERPL-SCNC: 22.2 MMOL/L (ref 22–29)
CREAT BLD-MCNC: 1.87 MG/DL (ref 0.57–1)
CREAT UR-MCNC: 75.8 MG/DL
CROSSMATCH INTERPRETATION: NORMAL
D-LACTATE SERPL-SCNC: 1.5 MMOL/L (ref 0.5–2)
DEPRECATED RDW RBC AUTO: 45.7 FL (ref 37–54)
DEPRECATED RDW RBC AUTO: 47.2 FL (ref 37–54)
DEPRECATED RDW RBC AUTO: 47.6 FL (ref 37–54)
DEPRECATED RDW RBC AUTO: 48.1 FL (ref 37–54)
ERYTHROCYTE [DISTWIDTH] IN BLOOD BY AUTOMATED COUNT: 15.4 % (ref 12.3–15.4)
ERYTHROCYTE [DISTWIDTH] IN BLOOD BY AUTOMATED COUNT: 15.5 % (ref 12.3–15.4)
ERYTHROCYTE [DISTWIDTH] IN BLOOD BY AUTOMATED COUNT: 15.7 % (ref 12.3–15.4)
ERYTHROCYTE [DISTWIDTH] IN BLOOD BY AUTOMATED COUNT: 15.7 % (ref 12.3–15.4)
GFR SERPL CREATININE-BSD FRML MDRD: 30 ML/MIN/1.73
GLUCOSE BLD-MCNC: 108 MG/DL (ref 65–99)
HAPTOGLOB SERPL-MCNC: 110 MG/DL (ref 30–200)
HCT VFR BLD AUTO: 15.3 % (ref 34–46.6)
HCT VFR BLD AUTO: 18.6 % (ref 34–46.6)
HCT VFR BLD AUTO: 22 % (ref 34–46.6)
HCT VFR BLD AUTO: 22.3 % (ref 34–46.6)
HGB BLD-MCNC: 5.4 G/DL (ref 12–15.9)
HGB BLD-MCNC: 6.5 G/DL (ref 12–15.9)
HGB BLD-MCNC: 7.7 G/DL (ref 12–15.9)
HGB BLD-MCNC: 7.7 G/DL (ref 12–15.9)
LDH SERPL-CCNC: 353 U/L (ref 135–214)
MCH RBC QN AUTO: 29.3 PG (ref 26.6–33)
MCH RBC QN AUTO: 29.5 PG (ref 26.6–33)
MCH RBC QN AUTO: 29.6 PG (ref 26.6–33)
MCH RBC QN AUTO: 30.2 PG (ref 26.6–33)
MCHC RBC AUTO-ENTMCNC: 34.5 G/DL (ref 31.5–35.7)
MCHC RBC AUTO-ENTMCNC: 34.9 G/DL (ref 31.5–35.7)
MCHC RBC AUTO-ENTMCNC: 35 G/DL (ref 31.5–35.7)
MCHC RBC AUTO-ENTMCNC: 35.3 G/DL (ref 31.5–35.7)
MCV RBC AUTO: 84.5 FL (ref 79–97)
MCV RBC AUTO: 84.6 FL (ref 79–97)
MCV RBC AUTO: 84.8 FL (ref 79–97)
MCV RBC AUTO: 85.5 FL (ref 79–97)
PLATELET # BLD AUTO: 105 10*3/MM3 (ref 140–450)
PLATELET # BLD AUTO: 115 10*3/MM3 (ref 140–450)
PLATELET # BLD AUTO: 118 10*3/MM3 (ref 140–450)
PLATELET # BLD AUTO: 120 10*3/MM3 (ref 140–450)
PMV BLD AUTO: 10 FL (ref 6–12)
PMV BLD AUTO: 10.1 FL (ref 6–12)
PMV BLD AUTO: 9.7 FL (ref 6–12)
PMV BLD AUTO: 9.9 FL (ref 6–12)
POTASSIUM BLD-SCNC: 4.3 MMOL/L (ref 3.5–5.2)
RBC # BLD AUTO: 1.79 10*6/MM3 (ref 3.77–5.28)
RBC # BLD AUTO: 2.2 10*6/MM3 (ref 3.77–5.28)
RBC # BLD AUTO: 2.6 10*6/MM3 (ref 3.77–5.28)
RBC # BLD AUTO: 2.63 10*6/MM3 (ref 3.77–5.28)
SODIUM BLD-SCNC: 135 MMOL/L (ref 136–145)
SODIUM UR-SCNC: 32 MMOL/L
UNIT  ABO: NORMAL
UNIT  RH: NORMAL
WBC NRBC COR # BLD: 7.58 10*3/MM3 (ref 3.4–10.8)
WBC NRBC COR # BLD: 7.67 10*3/MM3 (ref 3.4–10.8)
WBC NRBC COR # BLD: 9.45 10*3/MM3 (ref 3.4–10.8)
WBC NRBC COR # BLD: 9.84 10*3/MM3 (ref 3.4–10.8)

## 2020-05-17 PROCEDURE — 86900 BLOOD TYPING SEROLOGIC ABO: CPT

## 2020-05-17 PROCEDURE — 85027 COMPLETE CBC AUTOMATED: CPT | Performed by: INTERNAL MEDICINE

## 2020-05-17 PROCEDURE — 83010 ASSAY OF HAPTOGLOBIN QUANT: CPT | Performed by: INTERNAL MEDICINE

## 2020-05-17 PROCEDURE — 25010000002 HYDROMORPHONE PER 4 MG: Performed by: OBSTETRICS & GYNECOLOGY

## 2020-05-17 PROCEDURE — 36430 TRANSFUSION BLD/BLD COMPNT: CPT

## 2020-05-17 PROCEDURE — 82247 BILIRUBIN TOTAL: CPT | Performed by: INTERNAL MEDICINE

## 2020-05-17 PROCEDURE — 82248 BILIRUBIN DIRECT: CPT | Performed by: INTERNAL MEDICINE

## 2020-05-17 PROCEDURE — 84300 ASSAY OF URINE SODIUM: CPT | Performed by: INTERNAL MEDICINE

## 2020-05-17 PROCEDURE — 83615 LACTATE (LD) (LDH) ENZYME: CPT | Performed by: INTERNAL MEDICINE

## 2020-05-17 PROCEDURE — P9016 RBC LEUKOCYTES REDUCED: HCPCS

## 2020-05-17 PROCEDURE — 82570 ASSAY OF URINE CREATININE: CPT | Performed by: INTERNAL MEDICINE

## 2020-05-17 PROCEDURE — 80048 BASIC METABOLIC PNL TOTAL CA: CPT | Performed by: INTERNAL MEDICINE

## 2020-05-17 PROCEDURE — 25010000002 HYDROMORPHONE PER 4 MG: Performed by: INTERNAL MEDICINE

## 2020-05-17 PROCEDURE — 82550 ASSAY OF CK (CPK): CPT | Performed by: INTERNAL MEDICINE

## 2020-05-17 RX ORDER — HYDRALAZINE HYDROCHLORIDE 20 MG/ML
10 INJECTION INTRAMUSCULAR; INTRAVENOUS EVERY 6 HOURS PRN
Status: DISCONTINUED | OUTPATIENT
Start: 2020-05-17 | End: 2020-05-19 | Stop reason: HOSPADM

## 2020-05-17 RX ADMIN — SODIUM CHLORIDE, PRESERVATIVE FREE 10 ML: 5 INJECTION INTRAVENOUS at 23:49

## 2020-05-17 RX ADMIN — SODIUM CHLORIDE, PRESERVATIVE FREE 10 ML: 5 INJECTION INTRAVENOUS at 08:18

## 2020-05-17 RX ADMIN — LEVOTHYROXINE SODIUM 125 MCG: 125 TABLET ORAL at 06:16

## 2020-05-17 RX ADMIN — SODIUM CHLORIDE, POTASSIUM CHLORIDE, SODIUM LACTATE AND CALCIUM CHLORIDE 125 ML/HR: 600; 310; 30; 20 INJECTION, SOLUTION INTRAVENOUS at 05:23

## 2020-05-17 RX ADMIN — HYDROMORPHONE HYDROCHLORIDE 0.5 MG: 1 INJECTION, SOLUTION INTRAMUSCULAR; INTRAVENOUS; SUBCUTANEOUS at 06:16

## 2020-05-17 RX ADMIN — HYDROCODONE BITARTRATE AND ACETAMINOPHEN 1 TABLET: 5; 325 TABLET ORAL at 04:39

## 2020-05-17 RX ADMIN — HYDROCODONE BITARTRATE AND ACETAMINOPHEN 1 TABLET: 5; 325 TABLET ORAL at 10:26

## 2020-05-17 RX ADMIN — NIFEDIPINE 60 MG: 60 TABLET, FILM COATED, EXTENDED RELEASE ORAL at 09:19

## 2020-05-17 RX ADMIN — HYDROCODONE BITARTRATE AND ACETAMINOPHEN 1 TABLET: 5; 325 TABLET ORAL at 18:17

## 2020-05-17 NOTE — LACTATION NOTE
"Lactation follow-up . Patient was assisted to pump her breast during the night but had several transfusions delaying pumping to every four hours. Patient feeling much better and a \" pumping bra\" was made for her using a fetal monitor strap and she is delighted with it. Patient is aware of her milk supply being very low before the hemorrhage and now, being only a few drops, may not recover . Encouraged her to pump as able and will review hand expression later today as she is being transfused this morning and her breakfast tray has just arrived.   "

## 2020-05-17 NOTE — PROGRESS NOTES
DAILY PROGRESS NOTE    Patient Identification:  Name: Stephanie Rodriguez  Age: 40 y.o.  Sex: female  :  1980  MRN: [id number]               Subjective:  Interval History: Postop day 1 supracervical hysterectomy 10 days following .  Patient feeling better this morning.  Relates no dizziness.  Having some mild abdominal distention but no acute pain or sub- diaphragmatic pain.    Objective:    Scheduled Meds:  levothyroxine 125 mcg Oral Q AM   sodium chloride 10 mL Intravenous Q12H     Continuous Infusions:  lactated ringers 125 mL/hr Last Rate: 125 mL/hr (20 0523)     PRN Meds:•  docusate sodium  •  hydrALAZINE  •  HYDROcodone-acetaminophen  •  HYDROmorphone **AND** naloxone  •  ondansetron **OR** ondansetron  •  sodium chloride    Vital signs in last 24 hours:  Temp:  [97.8 °F (36.6 °C)-101.3 °F (38.5 °C)] 98.4 °F (36.9 °C)  Heart Rate:  [72-96] 81  Resp:  [10-20] 16  BP: (108-199)/() 110/74    Intake/Output:    Intake/Output Summary (Last 24 hours) at 2020 1201  Last data filed at 2020 1028  Gross per 24 hour   Intake 8280 ml   Output 3850 ml   Net 4430 ml       Exam:  General Appearance:    Alert, cooperative, no distress, appears stated age   Lungs:     Clear to auscultation bilaterally, respirations unlabored    Heart:    Regular rate and rhythm, S1 and S2 normal, no murmur, rub   or gallop   Abdomen:     Soft, non-tender, positive bowel sounds this morning although slightly distended.  Incision healing well.   Extremities:   Extremities normal, atraumatic, no cyanosis or edema   Skin:   Skin color, texture, turgor normal, no rashes or lesions        Data Review:  Called blood bank to accurately determine timing of transfusions of packed cells.  Patient did receive 6 units of packed cells intraoperatively with hemoglobin immediately after last unit in OR 7.4.  Patient then had the following timing of hemoglobins and transfusions as verified by blood  bank.  Hemoglobin at 2102      4.9               transfusion at 2130   1 unit packed cells  Hemoglobin at 2348      5.4               transfusion at 0219   1 unit packed cells  Hemoglobin at 0543      6.5               transfusion at 0807   1 unit of packed cells  Next hemoglobin scheduled at noon.  Lab Results (last 24 hours)     Procedure Component Value Units Date/Time    Lactate Dehydrogenase [[id number]] Collected:  05/17/20 0543    Specimen:  Blood Updated:  05/17/20 1149    Haptoglobin [[id number]] Collected:  05/17/20 0543    Specimen:  Blood Updated:  05/17/20 1149    Bilirubin, Total & Direct [[id number]] Collected:  05/17/20 0543    Specimen:  Blood Updated:  05/17/20 1149    Basic Metabolic Panel [[id number]]  (Abnormal) Collected:  05/17/20 0543    Specimen:  Blood Updated:  05/17/20 0640     Glucose 108 mg/dL      BUN 26 mg/dL      Creatinine 1.87 mg/dL      Sodium 135 mmol/L      Potassium 4.3 mmol/L      Chloride 103 mmol/L      CO2 22.2 mmol/L      Calcium 6.2 mg/dL      eGFR Non African Amer 30 mL/min/1.73      BUN/Creatinine Ratio 13.9     Anion Gap 9.8 mmol/L     Narrative:       GFR Normal >60  Chronic Kidney Disease <60  Kidney Failure <15      CBC (No Diff) [[id number]]  (Abnormal) Collected:  05/17/20 0543    Specimen:  Blood Updated:  05/17/20 0618     WBC 7.67 10*3/mm3      RBC 2.20 10*6/mm3      Hemoglobin 6.5 g/dL      Hematocrit 18.6 %      MCV 84.5 fL      MCH 29.5 pg      MCHC 34.9 g/dL      RDW 15.4 %      RDW-SD 45.7 fl      MPV 10.0 fL      Platelets 120 10*3/mm3     Lactic Acid, Reflex [[id number]]  (Normal) Collected:  05/16/20 2348    Specimen:  Blood Updated:  05/17/20 0033     Lactate 1.5 mmol/L     CBC (No Diff) [[id number]]  (Abnormal) Collected:  05/16/20 2348    Specimen:  Blood Updated:  05/17/20 0028     WBC 7.58 10*3/mm3      RBC 1.79 10*6/mm3      Hemoglobin 5.4 g/dL      Hematocrit 15.3 %      MCV 85.5 fL      MCH 30.2 pg      MCHC 35.3 g/dL      RDW 15.5 %      RDW-SD 47.2  fl      MPV 10.1 fL      Platelets 115 10*3/mm3     Comprehensive Metabolic Panel [[id number]]  (Abnormal) Collected:  05/16/20 2102    Specimen:  Blood Updated:  05/16/20 2139     Glucose 108 mg/dL      BUN 28 mg/dL      Creatinine 1.92 mg/dL      Sodium 135 mmol/L      Potassium 4.4 mmol/L      Chloride 102 mmol/L      CO2 22.1 mmol/L      Calcium 6.4 mg/dL      Total Protein 4.5 g/dL      Albumin 2.70 g/dL      ALT (SGPT) 17 U/L      AST (SGOT) 39 U/L      Alkaline Phosphatase 63 U/L      Total Bilirubin 0.4 mg/dL      eGFR Non African Amer 29 mL/min/1.73      Globulin 1.8 gm/dL      A/G Ratio 1.5 g/dL      BUN/Creatinine Ratio 14.6     Anion Gap 10.9 mmol/L     Narrative:       GFR Normal >60  Chronic Kidney Disease <60  Kidney Failure <15      Fibrinogen [[id number]]  (Normal) Collected:  05/16/20 2102    Specimen:  Blood Updated:  05/16/20 2128     Fibrinogen 225 mg/dL     Protime-INR [[id number]]  (Abnormal) Collected:  05/16/20 2102    Specimen:  Blood Updated:  05/16/20 2128     Protime 14.6 Seconds      INR 1.17    aPTT [[id number]]  (Normal) Collected:  05/16/20 2102    Specimen:  Blood Updated:  05/16/20 2128     PTT 24.2 seconds     CBC (No Diff) [[id number]]  (Abnormal) Collected:  05/16/20 2102    Specimen:  Blood Updated:  05/16/20 2117     WBC 8.43 10*3/mm3      RBC 1.66 10*6/mm3      Hemoglobin 4.9 g/dL      Hematocrit 14.4 %      MCV 86.7 fL      MCH 29.5 pg      MCHC 34.0 g/dL      RDW 14.9 %      RDW-SD 46.8 fl      MPV 9.7 fL      Platelets 114 10*3/mm3     Lactic Acid, Reflex Timer (This will reflex a repeat order 3-3:15 hours after ordered.) [[id number]] Collected:  05/16/20 1718    Specimen:  Blood Updated:  05/16/20 2100     Hold Tube Hold for add-ons.     Comment: Auto resulted.       Lactic Acid, Plasma [[id number]]  (Abnormal) Collected:  05/16/20 1718    Specimen:  Blood Updated:  05/16/20 1750     Lactate 2.2 mmol/L     CBC Auto Differential [[id number]]  (Abnormal) Collected:  05/16/20 1441     Specimen:  Blood Updated:  05/16/20 1550     WBC 27.21 10*3/mm3      RBC 2.42 10*6/mm3      Hemoglobin 7.4 g/dL      Hematocrit 21.5 %      MCV 88.8 fL      MCH 30.6 pg      MCHC 34.4 g/dL      RDW 14.4 %      RDW-SD 45.8 fl      MPV 9.2 fL      Platelets 269 10*3/mm3     Manual Differential [[id number]]  (Abnormal) Collected:  05/16/20 1441    Specimen:  Blood Updated:  05/16/20 1550     Neutrophil % 85.0 %      Lymphocyte % 4.0 %      Monocyte % 11.0 %      Neutrophils Absolute 23.13 10*3/mm3      Lymphocytes Absolute 1.09 10*3/mm3      Monocytes Absolute 2.99 10*3/mm3      Anisocytosis Mod/2+     Poikilocytes Large/3+     Rouleaux Mod/2+     Schistocytes Slight/1+     WBC Morphology Normal     Platelet Morphology Normal    POC Glucose Once [[id number]]  (Abnormal) Collected:  05/16/20 1537    Specimen:  Blood Updated:  05/16/20 1538     Glucose 141 mg/dL     Comprehensive Metabolic Panel [[id number]]  (Abnormal) Collected:  05/16/20 1441    Specimen:  Blood Updated:  05/16/20 1536     Glucose 163 mg/dL      BUN 25 mg/dL      Creatinine 1.55 mg/dL      Sodium 139 mmol/L      Potassium 5.6 mmol/L      Chloride 102 mmol/L      CO2 18.4 mmol/L      Calcium 7.5 mg/dL      Total Protein 5.7 g/dL      Albumin 3.60 g/dL      ALT (SGPT) 20 U/L      AST (SGOT) 31 U/L      Alkaline Phosphatase 85 U/L      Total Bilirubin 0.8 mg/dL      eGFR Non African Amer 37 mL/min/1.73      Globulin 2.1 gm/dL      A/G Ratio 1.7 g/dL      BUN/Creatinine Ratio 16.1     Anion Gap 18.6 mmol/L     Narrative:       GFR Normal >60  Chronic Kidney Disease <60  Kidney Failure <15      Fibrinogen [[id number]]  (Abnormal) Collected:  05/16/20 1530    Specimen:  Blood, Venous Line Updated:  05/16/20 1535     Fibrinogen 176 mg/dL     Protime-INR [[id number]]  (Abnormal) Collected:  05/16/20 1441    Specimen:  Blood Updated:  05/16/20 1531     Protime 15.8 Seconds      INR 1.30    aPTT [[id number]]  (Normal) Collected:  05/16/20 1441    Specimen:  Blood  Updated:  20 1531     PTT 27.8 seconds     CBC & Differential [[id number]] Collected:  20    Specimen:  Blood, Venous Line Updated:  20 1252    Narrative:       The following orders were created for panel order CBC & Differential.  Procedure                               Abnormality         Status                     ---------                               -----------         ------                     CBC Auto Differential[[id number]]        Abnormal            Final result                 Please view results for these tests on the individual orders.    CBC Auto Differential [[id number]]  (Abnormal) Collected:  20    Specimen:  Blood, Venous Line Updated:  20 125     WBC 11.96 10*3/mm3      RBC 1.73 10*6/mm3      Hemoglobin 5.3 g/dL      Hematocrit 15.7 %      MCV 90.8 fL      MCH 30.6 pg      MCHC 33.8 g/dL      RDW 14.5 %      RDW-SD 47.4 fl      MPV 9.0 fL      Platelets 135 10*3/mm3      Neutrophil % 85.2 %      Lymphocyte % 5.4 %      Monocyte % 7.9 %      Eosinophil % 0.1 %      Basophil % 0.1 %      Immature Grans % 1.3 %      Neutrophils, Absolute 10.20 10*3/mm3      Lymphocytes, Absolute 0.64 10*3/mm3      Monocytes, Absolute 0.95 10*3/mm3      Eosinophils, Absolute 0.01 10*3/mm3      Basophils, Absolute 0.01 10*3/mm3      Immature Grans, Absolute 0.15 10*3/mm3      nRBC 0.3 /100 WBC     aPTT [[id number]]  (Abnormal) Collected:  20    Specimen:  Blood, Venous Line Updated:  20 1240     PTT 44.1 seconds     Protime-INR [[id number]]  (Abnormal) Collected:  20    Specimen:  Blood, Venous Line Updated:  20 1239     Protime 21.9 Seconds      INR 1.95        Assessment:    Delayed postpartum hemorrhage    Postpartum hemorrhage    1.  Postop day 1 supracervical hysterectomy 10 days following  from uterine atony currently stable.  Patient has good urine output and pulse has remained in the 70 to 80s.  With careful assessment of timing  of transfusions and hemoglobins which is difficult to do through epic but verified through the blood bank, hemoglobins appear stable at this time and appropriate for the amount of blood loss.  I do not believe she was bleeding after the 7.4 postoperatively as that was most likely fictitiously high due to the recent multiple blood transfusions.  The 4.9 hemoglobin was more realistic of where she was postoperatively.    Plan:  1.  We will see what her noon hemoglobin looks like.  We will keep her in ICU tonight but if things are stable she could be transferred tomorrow.  Do not feel she needs a CT scan or ultrasound at this time unless she begins to drop her hemoglobins.    Chad Baum MD  5/17/2020

## 2020-05-17 NOTE — LACTATION NOTE
P1. Patient delivered twin boys 5/6/2020. She was started on a hospital grade breast pump but  at discharge babies were mostly formula fed and milk supply has remained low. Patient is AMA at 40 years of age , hypertensive with thyroid disease. She has a history of breast augmentation. Today she had emergency surgery for delayed postpartum hemorrhage.   LC was called to ICU to set up Hospital grade breastpump. Patient states she was getting small amounts of milk when pumping and would feed to babies with their formula.    ICU RN Madison was given instruction in use and cleaning of breastpump and kit. Patient will need help with pumping q three hours . Instruction on whiteboard in room.  Breasts are soft bilaterally with no signs of engorgement and only 1.5cc of milk was obtained when pumped at 1830.  LC will follow in a.m.

## 2020-05-17 NOTE — PROGRESS NOTES
"                                              LOS: 0 days   Patient Care Team:  Rene Campuzano as PCP - General (Internal Medicine)    Chief Complaint:  F/up massive postpartum uterine hemorrhage, status post massive transfusion, ICU care    Subjective   Interval History  Received an additional 4 units of PRBC yesterday after my initial encounter with her due to low hemoglobin.  However, she denies any vaginal bleeding and no bleeding reported by staff.    Clinically, she looks much better.  She is up in a chair.  She stated that she feels like her abdomen is a little more distended today and it hurts slightly.    REVIEW OF SYSTEMS:   CARDIOVASCULAR: No chest pain, chest pressure or chest discomfort. No palpitations or edema.   RESPIRATORY: No shortness of breath, cough or sputum.   GASTROINTESTINAL: No anorexia, nausea, vomiting or diarrhea. No abdominal pain or blood.   HEMATOLOGIC: No bleeding or bruising.     Ventilator/Non-Invasive Ventilation Settings (From admission, onward)    None                Physical Exam:     Vital Signs  Temp:  [97.8 °F (36.6 °C)-101.3 °F (38.5 °C)] 98.4 °F (36.9 °C)  Heart Rate:  [72-96] 72  Resp:  [10-20] 16  BP: (108-199)/() 124/69    Intake/Output Summary (Last 24 hours) at 5/17/2020 1054  Last data filed at 5/17/2020 1028  Gross per 24 hour   Intake 8280 ml   Output 3850 ml   Net 4430 ml     Flowsheet Rows      First Filed Value   Admission Height  162.6 cm (64\") Documented at 05/16/2020 0748   Admission Weight  90.7 kg (200 lb) Documented at 05/16/2020 0801          General Appearance:   Alert, cooperative, in no acute distress   ENMT:  Mallampati score 3, moist mucous membrane   Eyes:  Pupils equals and reactive to light. EOMI   Neck:   Trachea midline. No thyromegaly.   Lungs:    Clear to auscultation,respirations regular, even and non          labored    Heart:   Regular rhythm and normal rate, normal S1 and S2, no         murmur   Skin:   No abnormalities observed   "   Abdomen:    Obese. Soft.   no HSM.  Mild mid abdominal tenderness but no guarding or rebound.   Neuro:  Conscious, alert, oriented x3. Strength 5/5 in upper an lower  ext   Extremities:  Moves all extremities well, no edema, no cyanosis, no       redness          Results Review:        Results from last 7 days   Lab Units 05/17/20  0543 05/16/20  2102 05/16/20  1441   SODIUM mmol/L 135* 135* 139   POTASSIUM mmol/L 4.3 4.4 5.6*   CHLORIDE mmol/L 103 102 102   CO2 mmol/L 22.2 22.1 18.4*   BUN mg/dL 26* 28* 25*   CREATININE mg/dL 1.87* 1.92* 1.55*   GLUCOSE mg/dL 108* 108* 163*   CALCIUM mg/dL 6.2* 6.4* 7.5*         Results from last 7 days   Lab Units 05/17/20  0543 05/16/20  2348 05/16/20  2102   WBC 10*3/mm3 7.67 7.58 8.43   HEMOGLOBIN g/dL 6.5* 5.4* 4.9*   HEMATOCRIT % 18.6* 15.3* 14.4*   PLATELETS 10*3/mm3 120* 115* 114*     Results from last 7 days   Lab Units 05/16/20  2102 05/16/20  1441 05/16/20  1218   INR  1.17* 1.30* 1.95*   APTT seconds 24.2 27.8 44.1*           I reviewed the patient's new clinical results.  I personally viewed and interpreted the patient's CXR        Medication Review:     levothyroxine 125 mcg Oral Q AM   NIFEdipine CC 60 mg Oral Daily   sodium chloride 10 mL Intravenous Q12H         lactated ringers 125 mL/hr Last Rate: 125 mL/hr (05/17/20 0523)       Assessment   1. Delayed massive postpartum hemorrhage  2. Acute blood loss anemia  3. Postpartum day 11  4. Hypothyroidism  5. Hypertension  6. TAMMIE  7. Mild lactic acidosis, resolved  8. Leukocytosis, likely stress-induced  9. Mild hyponatremia  10. Mild DIC, improved      Plan     · Transfuse 1 unit PRBC  · Continue H&H every 6  · Hold nifedipine.  She had a little borderline blood pressure and her creatinine is elevated.  Will administer hydralazine every 6 as needed if SBP >160.  · Levothyroxine  · Check hemolysis work-up due to drop in hemoglobin with no obvious blood loss: Haptoglobin, LDH,  · The failure CT abdomen/pelvis to check  for bleeding to gynecology.  She has mild abdominal distention and pain but she looks clinically well and I do not know how much the CT will change the management.  · Check urine studies: FeNA, CK  · DVT prophylaxis with SCD  · Encouraged for ambulation    Continue monitoring in the intensive care unit due to the drop in hemoglobin.  If stable by tomorrow then she can be transferred to the floor    Discussed with ICU staff    Addendum:  Discussed with Dr.John Baum.  Apparently patient has received only 2 units of blood yesterday after she had the hemoglobin documented 4.5.  This indicates that her hemoglobin has risen appropriately and therefore much less concerned about internal bleeding or hemolysis.    Page Flores MD  05/17/20  10:54      Time: Critical care 32 min      This note was dictated utilizing Dragon dictation

## 2020-05-18 LAB
ANION GAP SERPL CALCULATED.3IONS-SCNC: 11.2 MMOL/L (ref 5–15)
BH BB BLOOD EXPIRATION DATE: NORMAL
BH BB BLOOD TYPE BARCODE: 9500
BH BB DISPENSE STATUS: NORMAL
BH BB PRODUCT CODE: NORMAL
BH BB UNIT NUMBER: NORMAL
BUN BLD-MCNC: 26 MG/DL (ref 6–20)
BUN/CREAT SERPL: 16.6 (ref 7–25)
CALCIUM SPEC-SCNC: 7.3 MG/DL (ref 8.6–10.5)
CHLORIDE SERPL-SCNC: 104 MMOL/L (ref 98–107)
CO2 SERPL-SCNC: 22.8 MMOL/L (ref 22–29)
CREAT BLD-MCNC: 1.57 MG/DL (ref 0.57–1)
CROSSMATCH INTERPRETATION: NORMAL
DEPRECATED RDW RBC AUTO: 48.6 FL (ref 37–54)
DEPRECATED RDW RBC AUTO: 49.4 FL (ref 37–54)
ERYTHROCYTE [DISTWIDTH] IN BLOOD BY AUTOMATED COUNT: 15.7 % (ref 12.3–15.4)
ERYTHROCYTE [DISTWIDTH] IN BLOOD BY AUTOMATED COUNT: 15.9 % (ref 12.3–15.4)
GFR SERPL CREATININE-BSD FRML MDRD: 36 ML/MIN/1.73
GLUCOSE BLD-MCNC: 91 MG/DL (ref 65–99)
HCT VFR BLD AUTO: 22 % (ref 34–46.6)
HCT VFR BLD AUTO: 22.1 % (ref 34–46.6)
HCT VFR BLD AUTO: 23.7 % (ref 34–46.6)
HGB BLD-MCNC: 7.5 G/DL (ref 12–15.9)
HGB BLD-MCNC: 7.6 G/DL (ref 12–15.9)
HGB BLD-MCNC: 8.1 G/DL (ref 12–15.9)
MCH RBC QN AUTO: 29.1 PG (ref 26.6–33)
MCH RBC QN AUTO: 29.8 PG (ref 26.6–33)
MCHC RBC AUTO-ENTMCNC: 33.9 G/DL (ref 31.5–35.7)
MCHC RBC AUTO-ENTMCNC: 34.5 G/DL (ref 31.5–35.7)
MCV RBC AUTO: 85.7 FL (ref 79–97)
MCV RBC AUTO: 86.3 FL (ref 79–97)
PLATELET # BLD AUTO: 142 10*3/MM3 (ref 140–450)
PLATELET # BLD AUTO: 149 10*3/MM3 (ref 140–450)
PMV BLD AUTO: 10.1 FL (ref 6–12)
PMV BLD AUTO: 10.2 FL (ref 6–12)
POTASSIUM BLD-SCNC: 4.1 MMOL/L (ref 3.5–5.2)
RBC # BLD AUTO: 2.55 10*6/MM3 (ref 3.77–5.28)
RBC # BLD AUTO: 2.58 10*6/MM3 (ref 3.77–5.28)
SODIUM BLD-SCNC: 138 MMOL/L (ref 136–145)
UNIT  ABO: NORMAL
UNIT  RH: NORMAL
WBC NRBC COR # BLD: 10.02 10*3/MM3 (ref 3.4–10.8)
WBC NRBC COR # BLD: 9.95 10*3/MM3 (ref 3.4–10.8)

## 2020-05-18 PROCEDURE — 85014 HEMATOCRIT: CPT | Performed by: OBSTETRICS & GYNECOLOGY

## 2020-05-18 PROCEDURE — 80048 BASIC METABOLIC PNL TOTAL CA: CPT | Performed by: INTERNAL MEDICINE

## 2020-05-18 PROCEDURE — 85018 HEMOGLOBIN: CPT | Performed by: OBSTETRICS & GYNECOLOGY

## 2020-05-18 PROCEDURE — 85027 COMPLETE CBC AUTOMATED: CPT | Performed by: INTERNAL MEDICINE

## 2020-05-18 RX ORDER — SODIUM CHLORIDE, SODIUM LACTATE, POTASSIUM CHLORIDE, CALCIUM CHLORIDE 600; 310; 30; 20 MG/100ML; MG/100ML; MG/100ML; MG/100ML
50 INJECTION, SOLUTION INTRAVENOUS CONTINUOUS
Status: DISCONTINUED | OUTPATIENT
Start: 2020-05-18 | End: 2020-05-19 | Stop reason: HOSPADM

## 2020-05-18 RX ORDER — OXYTOCIN-SODIUM CHLORIDE 0.9% IV SOLN 30 UNIT/500ML 30-0.9/5 UT/ML-%
333 SOLUTION INTRAVENOUS ONCE
Status: DISCONTINUED | OUTPATIENT
Start: 2020-05-18 | End: 2020-05-19 | Stop reason: HOSPADM

## 2020-05-18 RX ORDER — SIMETHICONE 80 MG
80 TABLET,CHEWABLE ORAL 4 TIMES DAILY PRN
Status: DISCONTINUED | OUTPATIENT
Start: 2020-05-18 | End: 2020-05-19 | Stop reason: HOSPADM

## 2020-05-18 RX ADMIN — SODIUM CHLORIDE, POTASSIUM CHLORIDE, SODIUM LACTATE AND CALCIUM CHLORIDE 50 ML/HR: 600; 310; 30; 20 INJECTION, SOLUTION INTRAVENOUS at 14:58

## 2020-05-18 RX ADMIN — NYSTATIN 500000 UNITS: 100000 SUSPENSION ORAL at 21:30

## 2020-05-18 RX ADMIN — Medication 80 MG: at 20:11

## 2020-05-18 RX ADMIN — SODIUM CHLORIDE, PRESERVATIVE FREE 10 ML: 5 INJECTION INTRAVENOUS at 20:35

## 2020-05-18 RX ADMIN — DOCUSATE SODIUM 100 MG: 100 CAPSULE, LIQUID FILLED ORAL at 20:12

## 2020-05-18 RX ADMIN — LEVOTHYROXINE SODIUM 125 MCG: 125 TABLET ORAL at 07:02

## 2020-05-18 RX ADMIN — SODIUM CHLORIDE, PRESERVATIVE FREE 10 ML: 5 INJECTION INTRAVENOUS at 11:33

## 2020-05-18 NOTE — PROGRESS NOTES
Gynecology Progress Note    Subjective   Stephanie Rodriguez is a 40 y.o.  who is POD 2 from supracervical hysterectomy ten days following  section. Patient is doing well today.  She is pumping for her infants.  She reports she was out of bed in the chair yesterday without issues. Having a very small amount of vaginal bleeding. Reports her pain is well controlled    Objective     Vital Signs  Temp:  [98.6 °F (37 °C)-99.3 °F (37.4 °C)] 98.6 °F (37 °C)  Heart Rate:  [75-86] 85  Resp:  [17] 17  BP: (109-139)/(67-88) 139/88    Physical Exam:     General Appearance:    Alert, cooperative, in no acute distress   Head:    Normocephalic, without obvious abnormality, atraumatic   Eyes:          No icterus, right and left eye normal   Lungs:     Respirations regular, even and                   unlabored    Heart:    Regular rate and thrythm   Breast Exam:    Deferred   Abdomen:     Normal bowel sounds, no masses, no organomegaly, soft        non-tender, non-distended, no guarding, no rebound                 Tenderness, Incision clean, dry, intact without erythema/induration/drainage   Genitalia:    Deferred   Extremities:   Moves all extremities well, no edema, no cyanosis, no              Redness, no sign of DVT   Pulses:   Pulses palpable and equal bilaterally   Skin:   No bleeding, bruising or rash    Neurologic:   Cranial nerves 2 - 12 grossly intact, sensation intact,        Results Review:    Lab Results   Component Value Date    WBC 9.95 2020    HGB 7.5 (L) 2020    HCT 22.1 (L) 2020    MCV 85.7 2020     2020        Assessment/Plan       Delayed postpartum hemorrhage    Postpartum hemorrhage      POD 2 from supracervical hysterectomy for delayed postpartum hemorrhage:   - doing well, overall   - No signs of bleeding on exam and her hemoglobin has been stable since her last transfusion and so we will change to q12h H&H   - given stability, consider transfer to  postpartum unit of approved by ICU team.        Samantha Loja MD  05/18/20  10:31

## 2020-05-18 NOTE — LACTATION NOTE
Mom cont to pump with hgp every 3-4 hours. Milk labeled and stored in freezer in [facility]. Mom denies questions at this time. Encouraged to call  if needing assistance.    Lactation Consult Note    Evaluation Completed: 2020 10:00  Patient Name: Stephanie Rodriguez  :  1980  MRN:  [id number]     REFERRAL  INFORMATION:                                         DELIVERY HISTORY:     O'Juan Donriel [[id number]]         O'Homero Don [[id number]]           O'Arian Dennys [[id number]]         O'Arian Homero [[id number]]        Skin to skin initiation date/time:      O'Arian Dennys [[id number]]         O'ArianJasonel [[id number]]            O'Arian Dennys [[id number]]         O'Arian Homero [[id number]]        Skin to skin end date/time:      O'Arian Dennys [[id number]]         O'ArianHomero [[id number]]            O'Arian Dennys [[id number]]         O'Arian Homero [[id number]]           O'Arian Dennys [[id number]]         O'Arian Homero [[id number]]          MATERNAL ASSESSMENT:                               INFANT ASSESSMENT:  Information for the patient's :  O'Dennys Don [[id number]]   No past medical history on file.  Information for the patient's :  O'Homero Don [[id number]]   No past medical history on file.       O'Juan Donriel [[id number]]         O'ArianHomero [[id number]]           O'Arian Dennys [[id number]]         O'Arian Homero [[id number]]           O'Arian Dennys [[id number]]         O'ArianJasonel [[id number]]           O'Arian Dennys [[id number]]         O'Arian Homero [[id number]]           O'Arian Dennys [[id number]]         O'Arian Homero [[id number]]           O'Arian Dennys [[id number]]         O'Arian Homero [[id number]]           O'Arian Dennys [[id number]]         O'ArianHomero [[id number]]           Dennys Brownlee [[id number]]         Homero Brownlee [[id number]]           Dennys Brownlee [[id number]]         Homero Brownlee [[id number]]            O'Arian, Dennys [[id number]]         O'Arian, Homero [[id number]]           O'Arian, Dennys [[id number]]         O'Arian, Homero [[id number]]           O'Arian, Dennys [[id number]]         O'Arian, Homero [[id number]]           O'Arian, Dennys [[id number]]         O'Arian, Homero [[id number]]           O'Arian, Dennys [[id number]]         O'Arian, Homero [[id number]]           O'Arian, Dennys [[id number]]         O'Arian, Homero [[id number]]           O'Arian, Dennys [[id number]]         O'Arian, Homero [[id number]]           O'Arian, Dennys [[id number]]         O'Arian Homero [[id number]]           O'Arian, Dennys [[id number]]         O'ArianJasonel [[id number]]           O'Arian Dennys [[id number]]         O'Arian Homero [[id number]]               O'Arian, Dennys [[id number]]         O'Arian, Homero [[id number]]           O'Arian, Dennys [[id number]]         O'Arian Homero [[id number]]           O'Arian, Dennys [[id number]]         O'Arian Homero [[id number]]           O'Arian, Dennys [[id number]]         O'ArianJasonel [[id number]]           O'Arian Dennys [[id number]]         O'Arian Homero [[id number]]           O'Arian, Dennys [[id number]]         O'Arian, Homero [[id number]]             O'Arian, Dennys [[id number]]         O'Arian Homero [[id number]]           O'Arian, Dennys [[id number]]         O'Arian, Homero [[id number]]           O'Arian, Dennys [[id number]]         O'Arian, Homero [[id number]]              MATERNAL INFANT FEEDING:                                                                       EQUIPMENT TYPE:                                 BREAST PUMPING:          LACTATION REFERRALS:

## 2020-05-18 NOTE — NURSING NOTE
Pt VSS, oriented x4, lethargic with generalized weakness, falls asleep abruptly especially while pumping. Pumped 3 times, 1-2ozs each time. 2 units of PRBCs given, pt reports feeling better after second unit. Will continue to monitor closely.

## 2020-05-18 NOTE — PLAN OF CARE
Problem: Patient Care Overview  Goal: Plan of Care Review  Outcome: Ongoing (interventions implemented as appropriate)  Flowsheets (Taken 5/18/2020 6985)  Progress: improving  Outcome Summary: Pt remains in ICU, H&H stable since last transfusion yesterday at 0800. Pt continuing to pump every 3-4hrs between sleep, oxygen breifly drops as low as 84% when deeply asleep. No signs of vaginal bleeding, pt reports only mild discomfort, did not want pain medication. Pumped milk dropped off in NICU milk storage room with lactation consultant.

## 2020-05-18 NOTE — PROGRESS NOTES
"                                              LOS: 1 day   Patient Care Team:  Rene Campuzano as PCP - General (Internal Medicine)    Chief Complaint:  F/up massive postpartum uterine hemorrhage, status post massive transfusion, ICU care    Subjective   Interval History  Has not required any blood transfusions since yesterday.  She looks better again.  Abdomen is less distended per her report and not hurting much.  No nausea or vomiting.  She had a bowel movement.  She still has her Joseph cath    REVIEW OF SYSTEMS:   CARDIOVASCULAR: No chest pain, chest pressure or chest discomfort. No palpitations or edema.   RESPIRATORY: No shortness of breath, cough or sputum.   GASTROINTESTINAL: No anorexia, nausea, vomiting or diarrhea. No abdominal pain or blood.   HEMATOLOGIC: No bleeding or bruising.     Ventilator/Non-Invasive Ventilation Settings (From admission, onward)    None                Physical Exam:     Vital Signs  Temp:  [98.6 °F (37 °C)-99.3 °F (37.4 °C)] 98.7 °F (37.1 °C)  Heart Rate:  [73-86] 76  Resp:  [15-20] 15  BP: (109-140)/(67-88) 135/83    Intake/Output Summary (Last 24 hours) at 5/18/2020 1315  Last data filed at 5/18/2020 0600  Gross per 24 hour   Intake --   Output 2400 ml   Net -2400 ml     Flowsheet Rows      First Filed Value   Admission Height  162.6 cm (64\") Documented at 05/16/2020 0748   Admission Weight  90.7 kg (200 lb) Documented at 05/16/2020 0801          General Appearance:   Alert, cooperative, in no acute distress   ENMT:  Mallampati score 3, moist mucous membrane   Eyes:  Pupils equals and reactive to light. EOMI   Neck:   Trachea midline. No thyromegaly.   Lungs:    Clear to auscultation,respirations regular, even and non          labored    Heart:   Regular rhythm and normal rate, normal S1 and S2, no         murmur   Skin:   No abnormalities observed   Abdomen:    Obese. Soft.   no HSM.  Mild mid abdominal tenderness but no guarding or rebound.   Neuro:  Conscious, alert, " oriented x3. Strength 5/5 in upper an lower  ext   Extremities:  Moves all extremities well, no edema, no cyanosis, no       redness          Results Review:        Results from last 7 days   Lab Units 05/18/20  0455 05/17/20  0543 05/16/20  2102   SODIUM mmol/L 138 135* 135*   POTASSIUM mmol/L 4.1 4.3 4.4   CHLORIDE mmol/L 104 103 102   CO2 mmol/L 22.8 22.2 22.1   BUN mg/dL 26* 26* 28*   CREATININE mg/dL 1.57* 1.87* 1.92*   GLUCOSE mg/dL 91 108* 108*   CALCIUM mg/dL 7.3* 6.2* 6.4*     Results from last 7 days   Lab Units 05/17/20  0543   CK TOTAL U/L 1,441*     Results from last 7 days   Lab Units 05/18/20  0455 05/17/20  2349 05/17/20  1748   WBC 10*3/mm3 9.95 10.02 9.84   HEMOGLOBIN g/dL 7.5* 7.6* 7.7*   HEMATOCRIT % 22.1* 22.0* 22.3*   PLATELETS 10*3/mm3 149 142 105*     Results from last 7 days   Lab Units 05/16/20  2102 05/16/20  1441 05/16/20  1218   INR  1.17* 1.30* 1.95*   APTT seconds 24.2 27.8 44.1*           I reviewed the patient's new clinical results.  I personally viewed and interpreted the patient's CXR        Medication Review:     levothyroxine 125 mcg Oral Q AM   sodium chloride 10 mL Intravenous Q12H            Assessment   1. Delayed massive postpartum hemorrhage  2. Acute blood loss anemia  3. Postpartum day 11  4. Hypothyroidism  5. Hypertension  6. TAMMIE, improving  7. Mild lactic acidosis, resolved  8. Leukocytosis, likely stress-induced  9. Mild hyponatremia  10. Mild DIC, improved.  Hemolysis work-up negative  11. Mild rhabdomyolysis      Plan     · Change H&H to twice daily only  · Continue holding nifedipine.  Her blood pressure is fine.  · Start gentle IV hydration due to elevated CK in the setting of renal failure (mild rhabdomyolysis).  Also her FeNa checked yesterday was 0.4% indicating prerenal failure  · Levothyroxine  · DVT prophylaxis with SCD  · Encouraged for ambulation  · Encouraged for lactation    Transfer to OB floor.  Discussed with ICU team during the multidisciplinary  round.    Discussed her hospital course and prognosis with her family over the phone    Time>35 min face to face and on the awtters >1/2 directed toward counseling and coordination of care    Page Flores MD  05/18/20  13:15            This note was dictated utilizing Dragon dictation

## 2020-05-19 ENCOUNTER — READMISSION MANAGEMENT (OUTPATIENT)
Dept: CALL CENTER | Facility: HOSPITAL | Age: 40
End: 2020-05-19

## 2020-05-19 VITALS
RESPIRATION RATE: 16 BRPM | TEMPERATURE: 98.9 F | SYSTOLIC BLOOD PRESSURE: 158 MMHG | BODY MASS INDEX: 30.22 KG/M2 | OXYGEN SATURATION: 99 % | DIASTOLIC BLOOD PRESSURE: 81 MMHG | HEIGHT: 64 IN | HEART RATE: 68 BPM | WEIGHT: 177 LBS

## 2020-05-19 LAB
ANION GAP SERPL CALCULATED.3IONS-SCNC: 8.5 MMOL/L (ref 5–15)
BUN BLD-MCNC: 21 MG/DL (ref 6–20)
BUN/CREAT SERPL: 16.5 (ref 7–25)
CALCIUM SPEC-SCNC: 8 MG/DL (ref 8.6–10.5)
CHLORIDE SERPL-SCNC: 103 MMOL/L (ref 98–107)
CO2 SERPL-SCNC: 25.5 MMOL/L (ref 22–29)
CREAT BLD-MCNC: 1.27 MG/DL (ref 0.57–1)
CYTO UR: NORMAL
GFR SERPL CREATININE-BSD FRML MDRD: 46 ML/MIN/1.73
GLUCOSE BLD-MCNC: 93 MG/DL (ref 65–99)
HCT VFR BLD AUTO: 21 % (ref 34–46.6)
HCT VFR BLD AUTO: 23.3 % (ref 34–46.6)
HGB BLD-MCNC: 7.2 G/DL (ref 12–15.9)
HGB BLD-MCNC: 7.8 G/DL (ref 12–15.9)
LAB AP CASE REPORT: NORMAL
LAB AP CLINICAL INFORMATION: NORMAL
PATH REPORT.FINAL DX SPEC: NORMAL
PATH REPORT.GROSS SPEC: NORMAL
POTASSIUM BLD-SCNC: 4.2 MMOL/L (ref 3.5–5.2)
SODIUM BLD-SCNC: 137 MMOL/L (ref 136–145)

## 2020-05-19 PROCEDURE — 85014 HEMATOCRIT: CPT | Performed by: OBSTETRICS & GYNECOLOGY

## 2020-05-19 PROCEDURE — 80048 BASIC METABOLIC PNL TOTAL CA: CPT | Performed by: OBSTETRICS & GYNECOLOGY

## 2020-05-19 PROCEDURE — 85018 HEMOGLOBIN: CPT | Performed by: OBSTETRICS & GYNECOLOGY

## 2020-05-19 RX ORDER — OXYCODONE HYDROCHLORIDE AND ACETAMINOPHEN 5; 325 MG/1; MG/1
2 TABLET ORAL EVERY 6 HOURS PRN
Qty: 24 TABLET | Refills: 0 | Status: SHIPPED | OUTPATIENT
Start: 2020-05-19 | End: 2020-06-23

## 2020-05-19 RX ORDER — NIFEDIPINE 60 MG/1
60 TABLET, EXTENDED RELEASE ORAL
Status: DISCONTINUED | OUTPATIENT
Start: 2020-05-19 | End: 2020-05-19 | Stop reason: HOSPADM

## 2020-05-19 RX ADMIN — NYSTATIN 500000 UNITS: 100000 SUSPENSION ORAL at 08:05

## 2020-05-19 RX ADMIN — NIFEDIPINE 60 MG: 60 TABLET, FILM COATED, EXTENDED RELEASE ORAL at 12:01

## 2020-05-19 RX ADMIN — DOCUSATE SODIUM 100 MG: 100 CAPSULE, LIQUID FILLED ORAL at 08:05

## 2020-05-19 RX ADMIN — Medication 80 MG: at 08:05

## 2020-05-19 RX ADMIN — LEVOTHYROXINE SODIUM 125 MCG: 125 TABLET ORAL at 05:52

## 2020-05-19 NOTE — LACTATION NOTE
Mom reports she pumped last at midnight. Encouraged to pump every 3 hours. Mom is staying well hydrated. She has hgp at bedside. Encouraged to call if needing assistance.    Lactation Consult Note    Evaluation Completed: 2020 10:51  Patient Name: Stephanie Rodriguez  :  1980  MRN:  [id number]     REFERRAL  INFORMATION:                                         DELIVERY HISTORY:     O'Arian Dennys [[id number]]         O'ArianHomero [[id number]]           O'Arian Dennys [[id number]]         O'Arian Homero [[id number]]        Skin to skin initiation date/time:      O'Arian Dennys [[id number]]         O'ArianJasonel [[id number]]            O'Arian Dennys [[id number]]         O'Arian Homero [[id number]]        Skin to skin end date/time:      O'Arian Dennys [[id number]]         O'ArianHomero [[id number]]            O'Arian Dennys [[id number]]         O'ArianJasonel [[id number]]           O'Arian Dennys [[id number]]         O'Arian Homero [[id number]]          MATERNAL ASSESSMENT:                               INFANT ASSESSMENT:  Information for the patient's :  O'Dennys Don [[id number]]   No past medical history on file.  Information for the patient's :  O'Homero Don [[id number]]   No past medical history on file.       O'Arian Dennys [[id number]]         O'Arian Homero [[id number]]           O'Arian Dennys [[id number]]         O'Arian Homero [[id number]]           O'Arian Dennys [[id number]]         O'Arian Homero [[id number]]           O'Arian Dennys [[id number]]         O'Arian Homero [[id number]]           O'Arian Dennys [[id number]]         O'Arian Homero [[id number]]           O'Arian Dennys [[id number]]         O'Arian Homero [[id number]]           O'Arian Dennys [[id number]]         O'Arian Homero [[id number]]           Dennys Brownlee [[id number]]         Homero Brownlee [[id number]]           Dennys Brownlee [[id number]]         Homero Brownlee [[id number]]            O'Arian, Dennys [[id number]]         O'Arian, Homero [[id number]]           O'Arian, Dennys [[id number]]         O'Arian, Homero [[id number]]           O'Arian, Dennys [[id number]]         O'Arian, Homero [[id number]]           O'Arian, Dennys [[id number]]         O'Arian, Homero [[id number]]           O'Arian, Dennys [[id number]]         O'Arian, Homero [[id number]]           O'Arian, Dennys [[id number]]         O'Arian, Homero [[id number]]           O'Arian, Dennys [[id number]]         O'Arian, Homero [[id number]]           O'Arian, Dennys [[id number]]         O'Arian Homero [[id number]]           O'Arian, Dennys [[id number]]         O'ArianJasonel [[id number]]           O'Arian Dennys [[id number]]         O'Arian Homero [[id number]]               O'Arian, Dennys [[id number]]         O'Arian, Homero [[id number]]           O'Arian, Dennys [[id number]]         O'Arian Homero [[id number]]           O'Arian, Dennys [[id number]]         O'Arian Homero [[id number]]           O'Arian, Dennys [[id number]]         O'ArianJasonel [[id number]]           O'Arian Dennys [[id number]]         O'Arian Homero [[id number]]           O'Arian, Dennys [[id number]]         O'Arian, Homero [[id number]]             O'Arian, Dennys [[id number]]         O'Arian Homero [[id number]]           O'Arian, Dennys [[id number]]         O'Arian, Homero [[id number]]           O'Arian, Dennys [[id number]]         O'Arian, Homero [[id number]]              MATERNAL INFANT FEEDING:                                                                       EQUIPMENT TYPE:                                 BREAST PUMPING:          LACTATION REFERRALS:

## 2020-05-19 NOTE — DISCHARGE SUMMARY
Date of Discharge:  2020    Discharge Diagnosis: S/P supracervical hysterectomy due to postpartum hemorrhage    Presenting Problem/History of Present Illness  Active Hospital Problems    Diagnosis  POA   • **Delayed postpartum hemorrhage [O72.2]  Unknown   • Postpartum hemorrhage [O72.1]  Yes      Resolved Hospital Problems   No resolved problems to display.      Hospital Course  Patient is a 40 y.o. female presented 10 days status post primary  with increase in vaginal bleeding.  Patient's hemoglobin in the emergency department was 5.4 and an ultrasound showed a markedly enlarged uterus with possible retained products.  Recommendations were to proceed with suction D&C and patient was counseled in the emergency department regarding risks, including risk for hysterectomy if bleeding does not stop.  Patient consented to proceed.  She underwent suction D&C in the operating room and bleeding was extremely heavy and uterus was atonic.  Decision was made to proceed with hysterectomy.  Procedure was converted to an abdominal supracervical hysterectomy and right salpingectomy.  Intraoperatively patient received 6 units of packed red blood cells, 2 units of platelets, and 4 units of fresh frozen plasma.  Her surgery was otherwise uncomplicated.  She was transferred to the ICU postoperatively.  Patient received additional blood products in the ICU.  She recovered well and had no signs of ongoing bleeding and hemoglobin stabilized at 7.  Patient met all postoperative milestones and on postoperative day #3, was stable for discharge home.  She was discharged home on her home dose of Procardia and was instructed to follow-up with Dr. Baum in 1 week.    Procedures Performed    Procedure(s):  DILATATION AND CURETTAGE WITH SUCTION converted to open hysterectomy and right salpingectomy  -------------------       Consults:   Consults     Date and Time Order Name Status Description    2020 0854 OB/GYN (on-call MD  unless specified) Completed             Condition on Discharge:  stable    Vital Signs  Temp:  [98.6 °F (37 °C)-99.5 °F (37.5 °C)] 98.9 °F (37.2 °C)  Heart Rate:  [63-82] 68  Resp:  [14-20] 16  BP: (129-160)/(67-88) 158/81    Physical Exam:   see progress note    Discharge Disposition  Home or Self Care    Discharge Medications     Discharge Medications      Continue These Medications      Instructions Start Date   albuterol sulfate  (90 Base) MCG/ACT inhaler  Commonly known as:  PROVENTIL HFA;VENTOLIN HFA;PROAIR HFA   2 puffs, Inhalation, Every 6 Hours PRN      ferrous sulfate 325 (65 FE) MG tablet   325 mg, Oral, Daily With Breakfast      fluticasone 50 MCG/ACT nasal spray  Commonly known as:  FLONASE   SHAKE LQ AND U 1 SPR IEN D      folic acid 1 MG tablet  Commonly known as:  FOLVITE   1,000 mcg, Oral, Daily      ibuprofen 600 MG tablet  Commonly known as:  ADVIL,MOTRIN   600 mg, Oral, Every 8 Hours PRN      levothyroxine 125 MCG tablet  Commonly known as:  SYNTHROID, LEVOTHROID   125 mcg, Oral, Daily      loratadine 10 MG tablet  Commonly known as:  CLARITIN   10 mg, Oral, Daily      mometasone 50 MCG/ACT nasal spray  Commonly known as:  NASONEX   2 sprays, Nasal, Daily      NIFEdipine CC 60 MG 24 hr tablet  Commonly known as:  ADALAT CC   60 mg, Oral, Daily      oxyCODONE-acetaminophen 5-325 MG per tablet  Commonly known as:  PERCOCET   2 tablets, Oral, Every 6 Hours PRN             Discharge Diet:     Activity at Discharge:   Activity Instructions     Pelvic Rest            Follow-up Appointments  No future appointments.  Additional Instructions for the Follow-ups that You Need to Schedule     Call MD With Problems / Concerns   As directed      Instructions: Call with heavy bleeding, uncontrolled pain, or fevers    Order Comments:  Instructions: Call with heavy bleeding, uncontrolled pain, or fevers          Discharge Follow-up with Specified Provider: Dr. Baum; 1 Week   As directed      To:    Penta    Follow Up:  1 Week               Test Results Pending at Discharge   Order Current Status    Tissue Pathology Exam In process           Shivani Simon MD  05/19/20  11:22    Time: Discharge >30 min

## 2020-05-19 NOTE — PROGRESS NOTES
LOS: 2 days   Patient Care Team:  Rene Campuzano as PCP - General (Internal Medicine)    Chief Complaint:  POD 3    Subjective     Interval History:     Patient is doing well this morning.  She has no complaints.  She is not requiring pain medication.  She denies headaches, vision changes, or right upper quadrant pain.  Patient is voiding without difficulty and has had a bowel movement.  She is tolerating a regular diet.  She denies any vaginal bleeding.  She is desiring discharge home today.    Review of Systems:    The following systems were reviewed and negative;  constitution, respiratory, cardiovascular, gastrointestinal, genitourinary, hematologic / lymphatic and endocrine    Objective     Vital Signs  Temp:  [98.6 °F (37 °C)-99.5 °F (37.5 °C)] 98.6 °F (37 °C)  Heart Rate:  [63-82] 63  Resp:  [14-20] 16  BP: (129-160)/(67-88) 160/78    Physical Exam:   General Appearance: alert, appears stated age and cooperative  Lungs: clear to auscultation, respirations regular, respirations even and respirations unlabored  Heart: regular rhythm & normal rate  Abdomen: normal bowel sounds, soft non-tender, no guarding, no rebound tenderness and Incision clean, dry, intact  Extremities: moves extremities well, no edema, no cyanosis and no redness     Results Review:     I reviewed the patient's new clinical results.      Assessment/Plan       Delayed postpartum hemorrhage    Postpartum hemorrhage      40-year-old female who is 3 days status post suction D&C and conversion to abdominal supracervical hysterectomy with right salpingectomy.    A drop in Hgb is noted from 8.1 to 7.2 this morning, but the 8.1 was most likely related to hemoconcentration.  Patient has no signs on exam of ongoing bleeding.  We will repeat her hemoglobin at this time to ensure that it remains stable.  Her blood pressure is noted to be severely elevated this morning, but her Procardia has been held.  We will restart her Procardia this morning  and observe blood pressures.  If hemoglobin remains stable and blood pressures improve, we will discharge patient home later today.  Discharge instructions were reviewed with the patient and she was advised to follow-up next week.      Shivani Simon MD  05/19/20  11:01

## 2020-05-19 NOTE — PLAN OF CARE
Vs stable. Temp max at 99.5.Declined pain med this shift, Mylicon given for gas discomfort. Started on Nystatin for white area on left side of tongue. Had BM. Using IS and ambulatng frequently in room without sx.Using EBP. Incision WNL. Cont with IVF with labs ordered this a.m.Urine output adequate.

## 2020-05-20 NOTE — OUTREACH NOTE
Prep Survey      Responses   [facility] [place] facility patient discharged from?  [place]   Is LACE score < 7 ?  No   Eligibility  Readm Mgmt   Discharge diagnosis  S/P supracervical hysterectomy due to postpartum hemorrhage   COVID-19 Test Status  Negative   Does the patient have one of the following disease processes/diagnoses(primary or secondary)?  General Surgery   Does the patient have Home health ordered?  No   Is there a DME ordered?  No   Prep survey completed?  Yes          Lety Flor RN

## 2020-05-21 ENCOUNTER — READMISSION MANAGEMENT (OUTPATIENT)
Dept: CALL CENTER | Facility: HOSPITAL | Age: 40
End: 2020-05-21

## 2020-05-21 NOTE — OUTREACH NOTE
General Surgery Week 1 Survey      Responses   [facility] patient discharged from?  [place]   Does the patient have one of the following disease processes/diagnoses(primary or secondary)?  General Surgery   Is there a successful TCM telephone encounter documented?  No   Week 1 attempt successful?  No   Unsuccessful attempts  Attempt 1          Princess Sy RN

## 2020-05-22 ENCOUNTER — READMISSION MANAGEMENT (OUTPATIENT)
Dept: CALL CENTER | Facility: HOSPITAL | Age: 40
End: 2020-05-22

## 2020-05-22 NOTE — OUTREACH NOTE
General Surgery Week 1 Survey      Responses   [facility] patient discharged from?  [place]   Does the patient have one of the following disease processes/diagnoses(primary or secondary)?  General Surgery   Is there a successful TCM telephone encounter documented?  No   Week 1 attempt successful?  Yes   Call start time  1454   Call end time  1458   Discharge diagnosis  S/P supracervical hysterectomy due to postpartum hemorrhage   Meds reviewed with patient/caregiver?  Yes   Is the patient having any side effects they believe may be caused by any medication additions or changes?  No   Does the patient have all medications related to this admission filled (includes all antibiotics, pain medications, etc.)  N/A   Is the patient taking all medications as directed (includes completed medication regime)?  N/A   Does the patient have a follow up appointment scheduled with their surgeon?  Yes   Has the patient kept scheduled appointments due by today?  N/A   Comments  Appointment on 5/26/2020 per pt.    Has home health visited the patient within 72 hours of discharge?  N/A   Psychosocial issues?  No   Did the patient receive a copy of their discharge instructions?  Yes   Nursing interventions  Reviewed instructions with patient   What is the patient's perception of their health status since discharge?  Improving   Nursing interventions  Nurse provided patient education   Is the patient /caregiver able to teach back basic post-op care?  Practice 'cough and deep breath', No tub bath, swimming, or hot tub until instructed by MD   Is the patient/caregiver able to teach back signs and symptoms of incisional infection?  Increased drainage or bleeding, Increased redness, swelling or pain at the incisonal site, Pus or odor from incision, Fever   Is the patient/caregiver able to teach back steps to recovery at home?  Set small, achievable goals for return to baseline health, Rest and rebuild strength, gradually increase  activity, Eat a well-balance diet   Is the patient/caregiver able to teach back the hierarchy of who to call/visit for symptoms/problems? PCP, Specialist, Home health nurse, Urgent Care, ED, 911  Yes   Week 1 call completed?  Yes   Wrap up additional comments  Vijay Hyatt ID# [id number]          Jem Aguilera RN

## 2020-05-26 ENCOUNTER — OFFICE VISIT (OUTPATIENT)
Dept: OBSTETRICS AND GYNECOLOGY | Facility: CLINIC | Age: 40
End: 2020-05-26

## 2020-05-26 VITALS
SYSTOLIC BLOOD PRESSURE: 130 MMHG | WEIGHT: 164 LBS | DIASTOLIC BLOOD PRESSURE: 70 MMHG | HEIGHT: 64 IN | BODY MASS INDEX: 28 KG/M2

## 2020-05-26 DIAGNOSIS — Z09 POSTOPERATIVE FOLLOW-UP: Primary | ICD-10-CM

## 2020-05-26 PROCEDURE — 99024 POSTOP FOLLOW-UP VISIT: CPT | Performed by: OBSTETRICS & GYNECOLOGY

## 2020-05-26 NOTE — PROGRESS NOTES
"Subjective    Chief Complaint   Patient presents with   • Postpartum Care     2wks pp c-sec, Dr. Baum, Boy Baby A 7lbs, Boy Baby B 6lbs 6oz, Breast and bottle feeding      History of Present Illness    Stephanie Rodriguez is a 40 y.o. female who presents for postop visit 10 days after supracervical hysterectomy for uterine atony with severe anemia.  Patient is doing extremely well with no fevers or bleeding.  She feels fine without dizziness.  She would like a hemoglobin done this week.  She also did have a lot of pulmonary problems during her pregnancy would like to see a pulmonologist and will call me in a couple weeks to see if we can find an office that is excepting patients at this time.     Obstetric History:  OB History        2    Para   1    Term           1    AB   1    Living   2       SAB   1    TAB        Ectopic        Molar        Multiple   1    Live Births   2               Menstrual History:     Patient's last menstrual period was 2019.       Past Medical History:   Diagnosis Date   • Disease of thyroid gland    • Hepatitis    • Hypertension      Family History   Problem Relation Age of Onset   • Ovarian cancer Maternal Aunt    • Birth defects Neg Hx        The following portions of the patient's history were reviewed and updated as appropriate: allergies, current medications, past medical history, past surgical history and problem list.    Review of Systems  As per HPI       Objective   Physical Exam  Incision healing well with no evidence of erythema induration or hematoma.  /70   Ht 162.6 cm (64\")   Wt 74.4 kg (164 lb)   LMP 2019   BMI 28.15 kg/m²     Assessment/Plan   Stephanie was seen today for postpartum care.    Diagnoses and all orders for this visit:    Postoperative follow-up  -     CBC & Differential; Future        Return to office 4 weeks.  We will check hemoglobin this week.               "

## 2020-05-27 RX ORDER — LEVOTHYROXINE SODIUM 0.12 MG/1
TABLET ORAL
Qty: 30 TABLET | Refills: 0 | Status: SHIPPED | OUTPATIENT
Start: 2020-05-27 | End: 2020-06-29

## 2020-05-27 RX ORDER — IBUPROFEN 600 MG/1
600 TABLET ORAL EVERY 8 HOURS PRN
Qty: 50 TABLET | Refills: 3 | Status: SHIPPED | OUTPATIENT
Start: 2020-05-27 | End: 2022-01-18

## 2020-05-27 RX ORDER — LEVOTHYROXINE SODIUM 0.12 MG/1
125 TABLET ORAL DAILY
Qty: 30 TABLET | Refills: 0
Start: 2020-05-27

## 2020-05-29 ENCOUNTER — READMISSION MANAGEMENT (OUTPATIENT)
Dept: CALL CENTER | Facility: HOSPITAL | Age: 40
End: 2020-05-29

## 2020-05-29 NOTE — OUTREACH NOTE
General Surgery Week 2 Survey      Responses   [facility] patient discharged from?  [place]   Does the patient have one of the following disease processes/diagnoses(primary or secondary)?  General Surgery   Week 2 attempt successful?  No   Unsuccessful attempts  Attempt 1          Anjana Suarez RN

## 2020-05-31 ENCOUNTER — RESULTS ENCOUNTER (OUTPATIENT)
Dept: OBSTETRICS AND GYNECOLOGY | Facility: CLINIC | Age: 40
End: 2020-05-31

## 2020-05-31 DIAGNOSIS — Z09 POSTOPERATIVE FOLLOW-UP: ICD-10-CM

## 2020-06-01 ENCOUNTER — READMISSION MANAGEMENT (OUTPATIENT)
Dept: CALL CENTER | Facility: HOSPITAL | Age: 40
End: 2020-06-01

## 2020-06-01 NOTE — OUTREACH NOTE
General Surgery Week 2 Survey      Responses   [facility] patient discharged from?  [place]   Does the patient have one of the following disease processes/diagnoses(primary or secondary)?  General Surgery   Week 2 attempt successful?  Yes   Call start time  1221   Call end time  1224   Discharge diagnosis  S/P supracervical hysterectomy due to postpartum hemorrhage   Is patient permission given to speak with other caregiver?  Yes   List who call center can speak with  SO   Meds reviewed with patient/caregiver?  Yes   Does the patient have all medications related to this admission filled (includes all antibiotics, pain medications, etc.)  N/A   Is the patient taking all medications as directed (includes completed medication regime)?  Yes   Does the patient have a follow up appointment scheduled with their surgeon?  Yes   Has the patient kept scheduled appointments due by today?  N/A   Comments  .   Psychosocial issues?  No   Comments  Pt denies any issues, no s/sx infection, denies fever, N/V, appetite is WNL. Sleeping well.    What is the patient's perception of their health status since discharge?  Improving   Is the patient /caregiver able to teach back basic post-op care?  Keep incision areas clean,dry and protected, Drive as instructed by MD in discharge instructions   Is the patient/caregiver able to teach back signs and symptoms of incisional infection?  Incisional warmth, Increased redness, swelling or pain at the incisonal site   Is the patient/caregiver able to teach back steps to recovery at home?  Rest and rebuild strength, gradually increase activity, Eat a well-balance diet   Is the patient/caregiver able to teach back the hierarchy of who to call/visit for symptoms/problems? PCP, Specialist, Home health nurse, Urgent Care, ED, 911  Yes   Week 2 call completed?  Yes          Montserrat Yarbrough RN

## 2020-06-09 ENCOUNTER — READMISSION MANAGEMENT (OUTPATIENT)
Dept: CALL CENTER | Facility: HOSPITAL | Age: 40
End: 2020-06-09

## 2020-06-09 NOTE — OUTREACH NOTE
General Surgery Week 3 Survey      Responses   [facility] patient discharged from?  [place]   Does the patient have one of the following disease processes/diagnoses(primary or secondary)?  General Surgery   Week 3 attempt successful?  Yes   Call start time  1527   Call end time  1533   Discharge diagnosis  S/P supracervical hysterectomy due to postpartum hemorrhage   If primary language is not English what is the name and relationship or agency of  used?  [employer]-ID #[id number] Jesse   Meds reviewed with patient/caregiver?  Yes   Is the patient taking all medications as directed (includes completed medication regime)?  Yes   Has the patient kept scheduled appointments due by today?  Yes   What is the patient's perception of their health status since discharge?  Improving   Nursing interventions  Nurse provided patient education   Is the patient /caregiver able to teach back basic post-op care?  Lifting as instructed by MD in discharge instructions, Take showers only when approved by MD-sponge bathe until then   Is the patient/caregiver able to teach back signs and symptoms of incisional infection?  Increased drainage or bleeding   Is the patient/caregiver able to teach back steps to recovery at home?  Rest and rebuild strength, gradually increase activity, Eat a well-balance diet   If the patient is a current smoker, are they able to teach back resources for cessation?  -- [Nonsmoker]   Additional teach back comments  Pt asked about constipation. Advised her to try warm prune or apple juice and it that's not helpful call PCP or ask pharmacist what she can take OTC. She verbalized understanding.   Week 3 call completed?  Yes   Revoked  No further contact(revokes)-requires comment   Graduated/Revoked comments  Pt has followed up with provider and has another appt scheduled. No further calls needed.           Rubia Chadwick RN

## 2020-06-19 DIAGNOSIS — D50.8 OTHER IRON DEFICIENCY ANEMIA: Primary | ICD-10-CM

## 2020-06-23 ENCOUNTER — POSTPARTUM VISIT (OUTPATIENT)
Dept: OBSTETRICS AND GYNECOLOGY | Facility: CLINIC | Age: 40
End: 2020-06-23

## 2020-06-23 ENCOUNTER — LAB (OUTPATIENT)
Dept: OBSTETRICS AND GYNECOLOGY | Facility: CLINIC | Age: 40
End: 2020-06-23

## 2020-06-23 VITALS
WEIGHT: 161 LBS | DIASTOLIC BLOOD PRESSURE: 75 MMHG | SYSTOLIC BLOOD PRESSURE: 131 MMHG | HEIGHT: 64 IN | BODY MASS INDEX: 27.49 KG/M2

## 2020-06-23 DIAGNOSIS — Z09 POSTOP CHECK: ICD-10-CM

## 2020-06-23 DIAGNOSIS — E03.9 HYPOTHYROIDISM (ACQUIRED): Primary | ICD-10-CM

## 2020-06-23 DIAGNOSIS — E03.9 HYPOTHYROIDISM (ACQUIRED): ICD-10-CM

## 2020-06-23 PROCEDURE — 99024 POSTOP FOLLOW-UP VISIT: CPT | Performed by: OBSTETRICS & GYNECOLOGY

## 2020-06-23 NOTE — PROGRESS NOTES
Subjective    Chief Complaint   Patient presents with   • Postpartum Care     6 wks pp, discuss thyroid       History of Present Illness    Stephanie Rodriguez is a 40 y.o. female who presents for postop supracervical hysterectomy approximately 10 days after  section.  Pathology benign.  Patient wanting her hemoglobin checked along with her thyroid test as she is on thyroid medication.  She has had no problems since her hysterectomy.    Obstetric History:  OB History        2    Para   1    Term           1    AB   1    Living   2       SAB   1    TAB        Ectopic        Molar        Multiple   1    Live Births   2               Menstrual History:     Patient's last menstrual period was 2019.       Past Medical History:   Diagnosis Date   • Disease of thyroid gland    • Hepatitis    • Hypertension      Family History   Problem Relation Age of Onset   • Ovarian cancer Maternal Aunt    • Birth defects Neg Hx      Social History     Tobacco Use   Smoking Status Never Smoker   Smokeless Tobacco Never Used         The following portions of the patient's history were reviewed and updated as appropriate: allergies, current medications, past medical history, past surgical history and problem list.    Review of Systems   Constitutional: Negative.  Negative for fever and unexpected weight change.   HENT: Negative.    Respiratory: Negative for shortness of breath and wheezing.    Cardiovascular: Negative for chest pain, palpitations and leg swelling.   Gastrointestinal: Negative for abdominal pain, anal bleeding and blood in stool.   Genitourinary: Negative for dysuria, pelvic pain, urgency, vaginal bleeding, vaginal discharge and vaginal pain.   Skin: Negative.    Neurological: Negative.    Hematological: Negative.  Negative for adenopathy.   Psychiatric/Behavioral: Negative.  Negative for dysphoric mood. The patient is not nervous/anxious.      Negative       Objective   Physical Exam  "  Constitutional: She is oriented to person, place, and time. Vital signs are normal. She appears well-developed and well-nourished.   HENT:   Head: Normocephalic.   Neck: Trachea normal. No tracheal deviation present. No thyromegaly present.   Cardiovascular: Normal rate, regular rhythm and normal heart sounds.   No murmur heard.  Pulmonary/Chest: Effort normal and breath sounds normal.   Breasts without masses, tenderness or nipple discharge   Abdominal: Soft. Normal appearance. She exhibits no mass. There is no hepatosplenomegaly. There is no tenderness. No hernia.   Incision well-healed   Genitourinary: Rectum normal, vagina normal and uterus normal. Uterus is not enlarged and not tender. Cervix exhibits no motion tenderness. Right adnexum displays no mass and no tenderness. Left adnexum displays no mass and no tenderness. No vaginal discharge found.   Genitourinary Comments: External genitalia normal .  Cervix negative.   Lymphadenopathy:     She has no cervical adenopathy.     She has no axillary adenopathy.   Neurological: She is alert and oriented to person, place, and time.   Skin: Skin is warm and dry. No rash noted.   Psychiatric: She has a normal mood and affect. Her behavior is normal. Cognition and memory are normal.       /75   Ht 162.6 cm (64\")   Wt 73 kg (161 lb)   LMP 08/02/2019   BMI 27.64 kg/m²     Assessment/Plan   Stephanie was seen today for postpartum care.    Diagnoses and all orders for this visit:    Hypothyroidism (acquired)  -     Thyroid Panel With TSH; Future    Postop check        Return to office 5 months. for AE and Mammogram.              "

## 2020-06-24 ENCOUNTER — TELEPHONE (OUTPATIENT)
Dept: OBSTETRICS AND GYNECOLOGY | Facility: CLINIC | Age: 40
End: 2020-06-24

## 2020-06-24 LAB
BASOPHILS # BLD AUTO: 0.04 10*3/MM3 (ref 0–0.2)
BASOPHILS NFR BLD AUTO: 0.5 % (ref 0–1.5)
EOSINOPHIL # BLD AUTO: 0.27 10*3/MM3 (ref 0–0.4)
EOSINOPHIL NFR BLD AUTO: 3.7 % (ref 0.3–6.2)
ERYTHROCYTE [DISTWIDTH] IN BLOOD BY AUTOMATED COUNT: 14.6 % (ref 12.3–15.4)
FT4I SERPL CALC-MCNC: 2.4 (ref 1.2–4.9)
HCT VFR BLD AUTO: 36.3 % (ref 34–46.6)
HGB BLD-MCNC: 12 G/DL (ref 12–15.9)
IMM GRANULOCYTES # BLD AUTO: 0.01 10*3/MM3 (ref 0–0.05)
IMM GRANULOCYTES NFR BLD AUTO: 0.1 % (ref 0–0.5)
LYMPHOCYTES # BLD AUTO: 1.57 10*3/MM3 (ref 0.7–3.1)
LYMPHOCYTES NFR BLD AUTO: 21.3 % (ref 19.6–45.3)
MCH RBC QN AUTO: 29.6 PG (ref 26.6–33)
MCHC RBC AUTO-ENTMCNC: 33.1 G/DL (ref 31.5–35.7)
MCV RBC AUTO: 89.4 FL (ref 79–97)
MONOCYTES # BLD AUTO: 0.45 10*3/MM3 (ref 0.1–0.9)
MONOCYTES NFR BLD AUTO: 6.1 % (ref 5–12)
NEUTROPHILS # BLD AUTO: 5.02 10*3/MM3 (ref 1.7–7)
NEUTROPHILS NFR BLD AUTO: 68.3 % (ref 42.7–76)
NRBC BLD AUTO-RTO: 0.1 /100 WBC (ref 0–0.2)
PLATELET # BLD AUTO: 352 10*3/MM3 (ref 140–450)
RBC # BLD AUTO: 4.06 10*6/MM3 (ref 3.77–5.28)
T3RU NFR SERPL: 26 % (ref 24–39)
T4 SERPL-MCNC: 9.4 UG/DL (ref 4.5–12)
TSH SERPL DL<=0.005 MIU/L-ACNC: 1.2 UIU/ML (ref 0.45–4.5)
WBC # BLD AUTO: 7.36 10*3/MM3 (ref 3.4–10.8)

## 2020-06-24 NOTE — TELEPHONE ENCOUNTER
----- Message from Chad Baum MD sent at 6/24/2020  9:32 AM EDT -----  This tell patient that all her labs including her hemoglobin and all her thyroid tests are totally normal now.

## 2020-06-29 RX ORDER — LEVOTHYROXINE SODIUM 0.12 MG/1
TABLET ORAL
Qty: 30 TABLET | Refills: 0 | Status: SHIPPED | OUTPATIENT
Start: 2020-06-29

## 2020-12-16 ENCOUNTER — OFFICE VISIT (OUTPATIENT)
Dept: OBSTETRICS AND GYNECOLOGY | Facility: CLINIC | Age: 40
End: 2020-12-16

## 2020-12-16 ENCOUNTER — APPOINTMENT (OUTPATIENT)
Dept: WOMENS IMAGING | Facility: HOSPITAL | Age: 40
End: 2020-12-16

## 2020-12-16 ENCOUNTER — PROCEDURE VISIT (OUTPATIENT)
Dept: OBSTETRICS AND GYNECOLOGY | Facility: CLINIC | Age: 40
End: 2020-12-16

## 2020-12-16 VITALS
BODY MASS INDEX: 31.24 KG/M2 | WEIGHT: 183 LBS | HEIGHT: 64 IN | SYSTOLIC BLOOD PRESSURE: 128 MMHG | DIASTOLIC BLOOD PRESSURE: 80 MMHG

## 2020-12-16 DIAGNOSIS — Z12.31 VISIT FOR SCREENING MAMMOGRAM: Primary | ICD-10-CM

## 2020-12-16 DIAGNOSIS — Z01.419 ENCOUNTER FOR GYNECOLOGICAL EXAMINATION WITHOUT ABNORMAL FINDING: Primary | ICD-10-CM

## 2020-12-16 DIAGNOSIS — Z12.39 ENCOUNTER FOR BREAST CANCER SCREENING USING NON-MAMMOGRAM MODALITY: ICD-10-CM

## 2020-12-16 PROCEDURE — 99396 PREV VISIT EST AGE 40-64: CPT | Performed by: OBSTETRICS & GYNECOLOGY

## 2020-12-16 PROCEDURE — 77067 SCR MAMMO BI INCL CAD: CPT | Performed by: RADIOLOGY

## 2020-12-16 PROCEDURE — 77067 SCR MAMMO BI INCL CAD: CPT | Performed by: OBSTETRICS & GYNECOLOGY

## 2020-12-16 PROCEDURE — 77063 BREAST TOMOSYNTHESIS BI: CPT | Performed by: OBSTETRICS & GYNECOLOGY

## 2020-12-16 PROCEDURE — 77063 BREAST TOMOSYNTHESIS BI: CPT | Performed by: RADIOLOGY

## 2020-12-16 RX ORDER — IBUPROFEN 600 MG/1
TABLET ORAL
COMMUNITY
Start: 2020-09-25

## 2020-12-16 RX ORDER — NIFEDIPINE 60 MG/1
60 TABLET, FILM COATED, EXTENDED RELEASE ORAL DAILY
COMMUNITY
Start: 2020-08-30

## 2020-12-16 RX ORDER — LEVOTHYROXINE SODIUM 0.1 MG/1
TABLET ORAL
COMMUNITY
Start: 2020-12-15

## 2020-12-16 NOTE — PROGRESS NOTES
Subjective    Chief Complaint   Patient presents with   • Gynecologic Exam     AE      History of Present Illness    Stephanie Rodriguez is a 40 y.o. female who presents for annual exam.  Non-smoker.  Previous supracervical hysterectomy for postpartum hemorrhaging.  Mammogram today.  Having no problems.    Obstetric History:  OB History        2    Para   1    Term           1    AB   1    Living   2       SAB   1    TAB        Ectopic        Molar        Multiple   1    Live Births   2               Menstrual History:     Patient's last menstrual period was 2019.       Past Medical History:   Diagnosis Date   • Disease of thyroid gland    • Hepatitis    • Hypertension      Family History   Problem Relation Age of Onset   • Ovarian cancer Maternal Aunt    • Birth defects Neg Hx      Social History     Tobacco Use   Smoking Status Never Smoker   Smokeless Tobacco Never Used         The following portions of the patient's history were reviewed and updated as appropriate: allergies, current medications, past family history, past medical history, past social history, past surgical history and problem list.    Review of Systems   Constitutional: Negative.  Negative for fever and unexpected weight change.   HENT: Negative.    Respiratory: Negative for shortness of breath and wheezing.    Cardiovascular: Negative for chest pain, palpitations and leg swelling.   Gastrointestinal: Negative for abdominal pain, anal bleeding and blood in stool.   Genitourinary: Negative for dysuria, pelvic pain, urgency, vaginal bleeding, vaginal discharge and vaginal pain.   Skin: Negative.    Neurological: Negative.    Hematological: Negative.  Negative for adenopathy.   Psychiatric/Behavioral: Negative.  Negative for dysphoric mood. The patient is not nervous/anxious.             Objective   Physical Exam  Vitals signs reviewed. Exam conducted with a chaperone present.   Constitutional:       Appearance: She is  "well-developed.   HENT:      Head: Normocephalic.   Eyes:      Pupils: Pupils are equal, round, and reactive to light.   Neck:      Thyroid: No thyromegaly.      Trachea: No tracheal deviation.   Cardiovascular:      Rate and Rhythm: Normal rate and regular rhythm.      Heart sounds: Normal heart sounds. No murmur.   Pulmonary:      Effort: Pulmonary effort is normal. No respiratory distress.      Breath sounds: Normal breath sounds.   Chest:      Breasts:         Right: Normal. No mass, nipple discharge or tenderness.         Left: Normal. No mass, nipple discharge or tenderness.   Abdominal:      Palpations: Abdomen is soft. There is no mass.      Tenderness: There is no abdominal tenderness.      Hernia: No hernia is present.   Genitourinary:     General: Normal vulva.      Labia:         Right: No tenderness or lesion.         Left: No tenderness or lesion.       Urethra: No prolapse or urethral lesion.      Vagina: Normal. No vaginal discharge.      Cervix: Normal.      Uterus: Absent.       Adnexa:         Right: No fullness.          Left: No fullness.        Rectum: Normal. No external hemorrhoid or internal hemorrhoid. Normal anal tone.      Comments: External genitalia normal  Lymphadenopathy:      Cervical: No cervical adenopathy.      Upper Body:      Right upper body: No axillary adenopathy.      Left upper body: No axillary adenopathy.   Skin:     General: Skin is warm and dry.      Findings: No rash.   Neurological:      Mental Status: She is alert and oriented to person, place, and time.   Psychiatric:         Behavior: Behavior normal.         /80   Ht 162.6 cm (64.02\")   Wt 83 kg (183 lb)   LMP 08/02/2019   Breastfeeding No   BMI 31.40 kg/m²     Assessment/Plan   Diagnoses and all orders for this visit:    1. Encounter for gynecological examination without abnormal finding (Primary)  -     IGP,rfx Aptima HPV All Pth    2. Encounter for breast cancer screening using non-mammogram " modality        Mammogram.  Return to office 1 year.  Counseled about beginning calcium with vitamin D.

## 2020-12-18 LAB
CONV .: NORMAL
CYTOLOGIST CVX/VAG CYTO: NORMAL
CYTOLOGY CVX/VAG DOC CYTO: NORMAL
CYTOLOGY CVX/VAG DOC THIN PREP: NORMAL
DX ICD CODE: NORMAL
HIV 1 & 2 AB SER-IMP: NORMAL
OTHER STN SPEC: NORMAL
STAT OF ADQ CVX/VAG CYTO-IMP: NORMAL

## 2020-12-29 ENCOUNTER — TELEPHONE (OUTPATIENT)
Dept: OBSTETRICS AND GYNECOLOGY | Facility: CLINIC | Age: 40
End: 2020-12-29

## 2020-12-29 NOTE — TELEPHONE ENCOUNTER
Patient called stating she received letter needing additional testing from her mammogram. Would like to have this ordered also would like to speak with someone regarding these results.

## 2020-12-29 NOTE — TELEPHONE ENCOUNTER
Spoke with pt informed her maci would be back in office tomorrow and would call to discuss results then place order for additional views. Knows most likely will call at end of day. SOFIA

## 2020-12-30 NOTE — TELEPHONE ENCOUNTER
Pt aware we are waiting for report and will call tomorrow or Monday depending on when we receive it. Pt stated understanding. SM

## 2020-12-30 NOTE — TELEPHONE ENCOUNTER
Ros I do not have copies of her mammogram to discuss with her.  I 'd be glad to call her if you could forward them to me.  REINIER

## 2021-01-04 DIAGNOSIS — N64.89 BREAST ASYMMETRY: ICD-10-CM

## 2021-01-04 DIAGNOSIS — R92.8 ABNORMALITY OF RIGHT BREAST ON SCREENING MAMMOGRAM: Primary | ICD-10-CM

## 2021-01-20 ENCOUNTER — PATIENT MESSAGE (OUTPATIENT)
Dept: OBSTETRICS AND GYNECOLOGY | Facility: CLINIC | Age: 41
End: 2021-01-20

## 2021-02-09 ENCOUNTER — APPOINTMENT (OUTPATIENT)
Dept: WOMENS IMAGING | Facility: HOSPITAL | Age: 41
End: 2021-02-09

## 2021-02-09 PROCEDURE — 76641 ULTRASOUND BREAST COMPLETE: CPT | Performed by: RADIOLOGY

## 2021-02-09 PROCEDURE — G0279 TOMOSYNTHESIS, MAMMO: HCPCS | Performed by: RADIOLOGY

## 2021-02-09 PROCEDURE — 77065 DX MAMMO INCL CAD UNI: CPT | Performed by: RADIOLOGY

## 2021-02-09 PROCEDURE — 77061 BREAST TOMOSYNTHESIS UNI: CPT | Performed by: RADIOLOGY

## 2021-02-10 ENCOUNTER — TELEPHONE (OUTPATIENT)
Dept: OBSTETRICS AND GYNECOLOGY | Facility: CLINIC | Age: 41
End: 2021-02-10

## 2021-02-10 DIAGNOSIS — R92.8 ABNORMALITY OF RIGHT BREAST ON SCREENING MAMMOGRAM: ICD-10-CM

## 2021-02-10 DIAGNOSIS — N63.10 BREAST MASS, RIGHT: ICD-10-CM

## 2021-02-10 DIAGNOSIS — N64.89 BREAST ASYMMETRY: ICD-10-CM

## 2021-02-10 DIAGNOSIS — R92.8 ABNORMAL MAMMOGRAM OF RIGHT BREAST: Primary | ICD-10-CM

## 2021-02-10 NOTE — TELEPHONE ENCOUNTER
Please call patient to discuss abnormal findings from her recent DX Mammo & US.  She is needing a biopsy.  [facility] is not aware.  Referrals have been entered and report in [facility].   SR

## 2021-02-11 NOTE — TELEPHONE ENCOUNTER
[facility] with [facility].  She is awaiting a call from you for scheduling.  Thank you.  REINIER

## 2021-03-02 ENCOUNTER — APPOINTMENT (OUTPATIENT)
Dept: WOMENS IMAGING | Facility: HOSPITAL | Age: 41
End: 2021-03-02

## 2021-03-02 PROCEDURE — 76942 ECHO GUIDE FOR BIOPSY: CPT | Performed by: RADIOLOGY

## 2021-03-02 PROCEDURE — 19000 PUNCTURE ASPIR CYST BREAST: CPT | Performed by: RADIOLOGY

## 2021-03-03 DIAGNOSIS — R92.8 ABNORMAL MAMMOGRAM OF RIGHT BREAST: ICD-10-CM

## 2021-03-03 DIAGNOSIS — N63.10 BREAST MASS, RIGHT: ICD-10-CM

## 2021-03-31 ENCOUNTER — BULK ORDERING (OUTPATIENT)
Dept: CASE MANAGEMENT | Facility: OTHER | Age: 41
End: 2021-03-31

## 2021-03-31 DIAGNOSIS — Z23 IMMUNIZATION DUE: ICD-10-CM

## 2022-01-18 RX ORDER — IBUPROFEN 600 MG/1
TABLET ORAL
Qty: 50 TABLET | Refills: 3 | Status: SHIPPED | OUTPATIENT
Start: 2022-01-18

## 2022-11-28 RX ORDER — IBUPROFEN 600 MG/1
TABLET ORAL
Qty: 50 TABLET | Refills: 3 | OUTPATIENT
Start: 2022-11-28

## 2023-01-17 RX ORDER — IBUPROFEN 600 MG/1
TABLET ORAL
Qty: 50 TABLET | Refills: 3 | OUTPATIENT
Start: 2023-01-17

## (undated) DEVICE — SOL IRR H2O BTL 1000ML STRL

## (undated) DEVICE — SUT VIC 4/0 KS 27IN VCP662H

## (undated) DEVICE — TBG W FLTR FOR BERKELEY SYSTEM

## (undated) DEVICE — STRAP STIRUP WO/ RNG

## (undated) DEVICE — TOTAL TRAY, 16FR 10ML SIL FOLEY, URN: Brand: MEDLINE

## (undated) DEVICE — Device

## (undated) DEVICE — KENDALL SCD EXPRESS SLEEVES, KNEE LENGTH, MEDIUM: Brand: KENDALL SCD

## (undated) DEVICE — 3M(TM) TEGADERM(TM) TRANSPARENT FILM DRESSING FRAME STYLE 1627: Brand: 3M™ TEGADERM™

## (undated) DEVICE — LOU D & C HYSTEROSCOPY: Brand: MEDLINE INDUSTRIES, INC.

## (undated) DEVICE — SPNG LAP 18X18IN LF STRL PK/5

## (undated) DEVICE — HOSE BT TO BT VAC CURETTAGE SNGL PT USE

## (undated) DEVICE — ST COL BERKELY TBG

## (undated) DEVICE — SACK GZ PERM

## (undated) DEVICE — ANTIBACTERIAL UNDYED BRAIDED (POLYGLACTIN 910), SYNTHETIC ABSORBABLE SUTURE: Brand: COATED VICRYL

## (undated) DEVICE — GLV SURG SENSICARE POLYISPRN W/ALOE PF LF 6.5 GRN STRL

## (undated) DEVICE — SUT GUT CHRM 0 CT 27IN 914H

## (undated) DEVICE — GLV SURG TRIUMPH CLASSIC PF LTX 7 STRL

## (undated) DEVICE — GLV SURG SENSICARE PI MIC PF SZ6 LF STRL

## (undated) DEVICE — CANN ASP BERKELEY VACURETTE CRV/TP 9MM DISP STRL

## (undated) DEVICE — CANSTR SXN UTER NO FLTR SURG